# Patient Record
Sex: MALE | Race: WHITE | Employment: OTHER | URBAN - METROPOLITAN AREA
[De-identification: names, ages, dates, MRNs, and addresses within clinical notes are randomized per-mention and may not be internally consistent; named-entity substitution may affect disease eponyms.]

---

## 2017-02-13 ENCOUNTER — TELEPHONE (OUTPATIENT)
Dept: INTERNAL MEDICINE | Facility: CLINIC | Age: 62
End: 2017-02-13

## 2017-02-13 ENCOUNTER — TRANSFERRED RECORDS (OUTPATIENT)
Dept: HEALTH INFORMATION MANAGEMENT | Facility: CLINIC | Age: 62
End: 2017-02-13

## 2017-02-13 NOTE — TELEPHONE ENCOUNTER
If his bp is down and doing better then they can continue to watch it. Otherwise probably needs the ER.  He can do a urine for the blood to make sure no infection.  He has a history of stones so may have blood from that as well.

## 2017-02-13 NOTE — TELEPHONE ENCOUNTER
Patient is called and informed of this message.  Patient understands and agrees to this plan.  Closing this encounter.    Suki Larson RN

## 2017-02-13 NOTE — TELEPHONE ENCOUNTER
Patient was down to see the infectious disease doctor this morning, and his BP was 193/96.  Wife is reporting he never has this high of BP reading.  IN the past they have had autonomic dysreflexia that has occasionally caused a spike in his BP readings, but they have not had this issue in a while and he did not get the other symptoms normally accompanying the high readings with it.      Wife is reporting patient is at work today, but she has noticed the past week he has been a lot more lethargic than normal.  He is reporting to her that he is not sleeping well and this is why he is having issues.  She is reporting he fell asleep while in the doctor appointment this morning.  She states he has been having small amounts of blood in his urine recently, but he will not tell her any symptoms he is having.    She is informed normally RN would send him to ED, but she is telling RN that trips to the ED usually lead to problems because the ED does not know the complexity of his health.  She is wondering if they have to go to the ED, or if PCP could suggest a better plan.    She is also wondering about the [facility] referral as she has not received a call from the [facility] yet.  Routing to PCP for further advice.    Suki Larson RN

## 2017-02-14 ENCOUNTER — TRANSFERRED RECORDS (OUTPATIENT)
Dept: HEALTH INFORMATION MANAGEMENT | Facility: CLINIC | Age: 62
End: 2017-02-14

## 2017-02-14 LAB — EJECTION FRACTION: 65

## 2017-02-17 ENCOUNTER — TRANSFERRED RECORDS (OUTPATIENT)
Dept: HEALTH INFORMATION MANAGEMENT | Facility: CLINIC | Age: 62
End: 2017-02-17

## 2017-03-21 ENCOUNTER — OFFICE VISIT (OUTPATIENT)
Dept: INTERNAL MEDICINE | Facility: CLINIC | Age: 62
End: 2017-03-21
Payer: COMMERCIAL

## 2017-03-21 VITALS
BODY MASS INDEX: 27.89 KG/M2 | TEMPERATURE: 96.8 F | OXYGEN SATURATION: 97 % | WEIGHT: 200 LBS | DIASTOLIC BLOOD PRESSURE: 66 MMHG | SYSTOLIC BLOOD PRESSURE: 114 MMHG | HEART RATE: 72 BPM

## 2017-03-21 DIAGNOSIS — N20.0 CALCULUS OF KIDNEY: ICD-10-CM

## 2017-03-21 DIAGNOSIS — Z01.818 PREOP GENERAL PHYSICAL EXAM: Primary | ICD-10-CM

## 2017-03-21 DIAGNOSIS — E11.29 TYPE 2 DIABETES MELLITUS WITH OTHER DIABETIC KIDNEY COMPLICATION (H): ICD-10-CM

## 2017-03-21 DIAGNOSIS — G82.50 QUADRIPLEGIA (H): ICD-10-CM

## 2017-03-21 LAB
ALBUMIN SERPL-MCNC: 3.1 G/DL (ref 3.4–5)
ALP SERPL-CCNC: 106 U/L (ref 40–150)
ALT SERPL W P-5'-P-CCNC: 20 U/L (ref 0–70)
ANION GAP SERPL CALCULATED.3IONS-SCNC: 7 MMOL/L (ref 3–14)
AST SERPL W P-5'-P-CCNC: 11 U/L (ref 0–45)
BILIRUB SERPL-MCNC: 0.5 MG/DL (ref 0.2–1.3)
BUN SERPL-MCNC: 34 MG/DL (ref 7–30)
CALCIUM SERPL-MCNC: 8.5 MG/DL (ref 8.5–10.1)
CHLORIDE SERPL-SCNC: 105 MMOL/L (ref 94–109)
CO2 SERPL-SCNC: 30 MMOL/L (ref 20–32)
CREAT SERPL-MCNC: 0.62 MG/DL (ref 0.66–1.25)
ERYTHROCYTE [DISTWIDTH] IN BLOOD BY AUTOMATED COUNT: 18.3 % (ref 10–15)
GFR SERPL CREATININE-BSD FRML MDRD: ABNORMAL ML/MIN/1.7M2
GLUCOSE SERPL-MCNC: 222 MG/DL (ref 70–99)
HBA1C MFR BLD: 6.6 % (ref 4.3–6)
HCT VFR BLD AUTO: 39.3 % (ref 40–53)
HGB BLD-MCNC: 12.5 G/DL (ref 13.3–17.7)
MCH RBC QN AUTO: 29 PG (ref 26.5–33)
MCHC RBC AUTO-ENTMCNC: 31.8 G/DL (ref 31.5–36.5)
MCV RBC AUTO: 91 FL (ref 78–100)
PLATELET # BLD AUTO: 217 10E9/L (ref 150–450)
POTASSIUM SERPL-SCNC: 4.3 MMOL/L (ref 3.4–5.3)
PROT SERPL-MCNC: 8 G/DL (ref 6.8–8.8)
RBC # BLD AUTO: 4.31 10E12/L (ref 4.4–5.9)
SODIUM SERPL-SCNC: 142 MMOL/L (ref 133–144)
WBC # BLD AUTO: 6.3 10E9/L (ref 4–11)

## 2017-03-21 PROCEDURE — 36415 COLL VENOUS BLD VENIPUNCTURE: CPT | Performed by: INTERNAL MEDICINE

## 2017-03-21 PROCEDURE — 80053 COMPREHEN METABOLIC PANEL: CPT | Performed by: INTERNAL MEDICINE

## 2017-03-21 PROCEDURE — 99214 OFFICE O/P EST MOD 30 MIN: CPT | Performed by: INTERNAL MEDICINE

## 2017-03-21 PROCEDURE — 83036 HEMOGLOBIN GLYCOSYLATED A1C: CPT | Performed by: INTERNAL MEDICINE

## 2017-03-21 PROCEDURE — 93000 ELECTROCARDIOGRAM COMPLETE: CPT | Performed by: INTERNAL MEDICINE

## 2017-03-21 PROCEDURE — 85027 COMPLETE CBC AUTOMATED: CPT | Performed by: INTERNAL MEDICINE

## 2017-03-21 ASSESSMENT — PAIN SCALES - GENERAL: PAINLEVEL: SEVERE PAIN (6)
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
## 2017-03-21 NOTE — NURSING NOTE
"Chief Complaint   Patient presents with     Pre-Op Exam       Initial /66 (BP Location: Left arm, Patient Position: Chair, Cuff Size: Adult Large)  Pulse 72  Temp 96.8  F (36  C) (Temporal)  Wt 200 lb (90.7 kg)  SpO2 97%  BMI 27.89 kg/m2 Estimated body mass index is 27.89 kg/(m^2) as calculated from the following:    Height as of 9/9/16: 5' 11\" (1.803 m).    Weight as of this encounter: 200 lb (90.7 kg).  Medication Reconciliation: complete    "

## 2017-03-21 NOTE — MR AVS SNAPSHOT
After Visit Summary   3/21/2017    Alexandre Velásquez    MRN: [id number]           Patient Information     Date Of Birth          1955        Visit Information        Provider Department      3/21/2017 9:00 AM Manny Seymour MD [facility]        Today's Diagnoses     Preop general physical exam    -  1      Care Instructions      Before Your Surgery      Call your surgeon if there is any change in your health. This includes signs of a cold or flu (such as a sore throat, runny nose, cough, rash or fever).    Do not smoke, drink alcohol or take over the counter medicine (unless your surgeon or primary care doctor tells you to) for the 24 hours before and after surgery.    If you take prescribed drugs: Follow your doctor s orders about which medicines to take and which to stop until after surgery.    Eating and drinking prior to surgery: follow the instructions from your surgeon    Take a shower or bath the night before surgery. Use the soap your surgeon gave you to gently clean your skin. If you do not have soap from your surgeon, use your regular soap. Do not shave or scrub the surgery site.  Wear clean pajamas and have clean sheets on your bed.         Follow-ups after your visit        Who to contact     If you have questions or need follow up information about today's clinic visit or your schedule please contact [facility] directly at [phone].  Normal or non-critical lab and imaging results will be communicated to you by MyChart, letter or phone within 4 business days after the clinic has received the results. If you do not hear from us within 7 days, please contact the clinic through MyChart or phone. If you have a critical or abnormal lab result, we will notify you by phone as soon as possible.  Submit refill requests through [employer] or call your pharmacy and they will forward the refill request to us. Please allow 3 business days for your refill to be  "completed.          Additional Information About Your Visit        [employer] Information     [employer] lets you send messages to your doctor, view your test results, renew your prescriptions, schedule appointments and more. To sign up, go to [email or web address] . Click on \"Log in\" on the left side of the screen, which will take you to the Welcome page. Then click on \"Sign up Now\" on the right side of the page.     You will be asked to enter the access code listed below, as well as some personal information. Please follow the directions to create your username and password.     Your access code is: [email or web address]  Expires: 2017  9:09 AM     Your access code will  in 90 days. If you need help or a new code, please call your [facility] clinic or [phone].        Care EveryWhere ID     This is your Care EveryWhere ID. This could be used by other organizations to access your [facility] medical records  [id number]        Your Vitals Were     Pulse Temperature Pulse Oximetry BMI (Body Mass Index)          72 96.8  F (36  C) (Temporal) 97% 27.89 kg/m2         Blood Pressure from Last 3 Encounters:   17 114/66   16 98/62   16 120/64    Weight from Last 3 Encounters:   17 200 lb (90.7 kg)   16 195 lb (88.5 kg)   16 200 lb 9.9 oz (91 kg)              Today, you had the following     No orders found for display       Primary Care Provider Office Phone # Fax #    Manny Seymour [phone]       [facility] [address] [place] [address]        Thank you!     Thank you for choosing [facility]  for your care. Our goal is always to provide you with excellent care. Hearing back from our patients is one way we can continue to improve our services. Please take a few minutes to complete the written survey that you may receive in the mail after your visit with us. Thank you!             Your Updated Medication List - Protect others " around you: Learn how to safely use, store and throw away your medicines at www.disposemymeds.org.          This list is accurate as of: 3/21/17  9:09 AM.  Always use your most recent med list.                   Brand Name Dispense Instructions for use    albuterol 108 (90 BASE) MCG/ACT Inhaler    PROAIR HFA/PROVENTIL HFA/VENTOLIN HFA    1 Inhaler    Inhale 2 puffs into the lungs every 6 hours as needed for shortness of breath / dyspnea or wheezing       baclofen 20 MG tablet    LIORESAL    270 tablet    TAKE 1 TABLET (20 MG) BY MOUTH 4 TIMES DAILY AS NEEDED       CHOICE DM FIBER-BURST OR      Take  by mouth daily. Am and pm       COENZYME Q-10 PO      Take 100 mg by mouth 2 times daily.       DULoxetine 60 MG EC capsule    CYMBALTA    90 capsule    TAKE 1 CAPSULE (60 MG) BY MOUTH DAILY       fish oil-omega-3 fatty acids 1000 MG capsule      Take 1,000 mg by mouth 2 times daily       FLORASTOR 250 MG capsule   Generic drug:  saccharomyces boulardii      Take 250 mg by mouth daily       furosemide 20 MG tablet    LASIX    30 tablet    Take 1 tablet (20 mg) by mouth daily as needed       glipiZIDE XL 5 MG 24 hr tablet   Generic drug:  glipiZIDE     90 tablet    TAKE 1 TABLET (5 MG) BY MOUTH DAILY       MIRALAX PO      Take by mouth 2 times daily       Multiple vitamin Tabs      1 TABLET DAILY       * order for DME     1 Device    Wheelchair repair [facility] fax#  [phone]       * order for DME     1 Device    Battery for power wheel chair       * order for DME     1 each    Equipment being ordered: Wheel chair repair.  Wheelchair necessary for patient.       * order for DME     30 days    Equipment being ordered:[facility] Fax [phone] Reference Number# [id number] Primary Dressing Drawtex 3x39 roll Qty 5 rolls Length of Need: 1 month Frequency of dressing change: daily       order for DME     1 REMOVE    Equipment being ordered: Wheelchair Repair       * order for DME     1 Device    Equipment being1 ordered:  Wheelchair-Kilt motor for electric wheelchair       pantoprazole 40 MG EC tablet    PROTONIX    60 tablet    Take 1 tablet (40 mg) by mouth 2 times daily (before meals)       probiotic Caps      daily       sennosides 8.6 MG tablet    SENOKOT    120 each    Take 2 tablets by mouth 2 times daily       PILAR FOR MEN Misc     120 Bottle    1 pad as needed.       TYLENOL CAPS 500 MG OR     60 Cap    2 CAPSULE EVERY 6 HOURS AS NEEDED (taking 4 x daily as needed)       * Notice:  This list has 5 medication(s) that are the same as other medications prescribed for you. Read the directions carefully, and ask your doctor or other care provider to review them with you.

## 2017-03-27 PROBLEM — I63.40 CEREBRAL INFARCTION DUE TO EMBOLISM OF CEREBRAL ARTERY (H): Status: ACTIVE | Noted: 2017-03-27

## 2017-04-05 ENCOUNTER — TRANSFERRED RECORDS (OUTPATIENT)
Dept: HEALTH INFORMATION MANAGEMENT | Facility: CLINIC | Age: 62
End: 2017-04-05

## 2017-04-19 DIAGNOSIS — G82.50 QUADRIPLEGIA (H): ICD-10-CM

## 2017-04-19 DIAGNOSIS — G89.4 CHRONIC PAIN SYNDROME: ICD-10-CM

## 2017-04-20 NOTE — TELEPHONE ENCOUNTER
baclofen (LIORESAL) 20 MG tablet      Last Written Prescription Date:  6/17/16  Last Fill Quantity: 270,   # refills: 3  Last Office Visit with [facility], [facility] or [facility] prescribing provider: 12/14/16  Future Office visit:       Routing refill request to provider for review/approval because:  Drug not on the [facility], [facility] or [facility] refill protocol or controlled substance

## 2017-04-21 RX ORDER — BACLOFEN 20 MG/1
TABLET ORAL
Qty: 270 TABLET | Refills: 1 | Status: SHIPPED | OUTPATIENT
Start: 2017-04-21 | End: 2018-02-13

## 2017-05-05 DIAGNOSIS — L89.324 DECUBITUS ULCER OF LEFT ISCHIUM, STAGE 4 (H): ICD-10-CM

## 2017-05-05 NOTE — TELEPHONE ENCOUNTER
Cymbalta     Last Written Prescription Date: 10/18/2016  Last Fill Quantity: 90, # refills: 1  Last Office Visit with [facility], [facility] or [facility] prescribing provider: 3/21/2017        BP Readings from Last 3 Encounters:   03/21/17 114/66   11/04/16 98/62   09/09/16 120/64     Pulse: (for Fetzima)  Creatinine   Date Value Ref Range Status   03/21/2017 0.62 (L) 0.66 - 1.25 mg/dL Final   ]    Last PHQ-9 score on record= No flowsheet data found.

## 2017-05-08 RX ORDER — DULOXETIN HYDROCHLORIDE 60 MG/1
CAPSULE, DELAYED RELEASE ORAL
Qty: 90 CAPSULE | Refills: 1 | Status: SHIPPED | OUTPATIENT
Start: 2017-05-08 | End: 2018-02-22

## 2017-05-08 NOTE — TELEPHONE ENCOUNTER
Routing refill request to provider for review/approval because:  Labs out of range:  Cr 0.62  Kasia Mckenna RN

## 2017-05-24 ENCOUNTER — TRANSFERRED RECORDS (OUTPATIENT)
Dept: HEALTH INFORMATION MANAGEMENT | Facility: CLINIC | Age: 62
End: 2017-05-24

## 2017-05-24 ENCOUNTER — TELEPHONE (OUTPATIENT)
Dept: INTERNAL MEDICINE | Facility: CLINIC | Age: 62
End: 2017-05-24

## 2017-05-24 NOTE — TELEPHONE ENCOUNTER
Spoke to the patient's wife. She said today the patient is just not feeling well. She said he is breathing very shallow and not catching his breath very well. Wife said they did do an Albuterol neb treatment but that did not help him at all. Wife said the patient has been taking off his cpap at night and she feels this is some of the cause. Wife said she is concerned because he feels very spacy, lightheaded, and dizzy.     RN advised if he is breathing shallow, having a hard time catching his breath, lightheaded and not feeling well, he needs to be seen. Wife agrees. She would like to bring him to the ED. Wife said they will likely go to the [facility] ED. She said the [facility] ED usually sends him out any ways so they would like to skip that process.     Wife said she is wondering if the patient needs oxygen added to his cpap. She said she is going to call the cpap company to see if the patient can get a better mask. And then she would like to discuss adding oxygen.     Wife requested a message be sent to Dr. Seymour so he knows what is going on. She said they will likely need an oxygen order for his cpap if that is recommended. Wife is bringing patient to the ED now.     Will route to PCP as PAOLA.     Lisa Aquino RN  [facility]

## 2017-05-24 NOTE — TELEPHONE ENCOUNTER
"Reason for call:  Patient reporting a symptom    Symptom or request: Patient has been short of breath and feeling light headed and \"spacey\" This is due to lack of oxygen because he takes his cpap mask off at night. Wife if wondering if he should go to the ED or should he be seen by Dr. Seymour. Wondering if maybe O2 should be added to his cpap machine.    Duration (how long have symptoms been present): 1 week    Have you been treated for this before? Yes    Phone Number patient can be reached at:  Home number on file [phone]    Best Time:  any    Can we leave a detailed message on this number:  YES    Call taken on 5/24/2017 at 8:20 AM by Luba Prabhakar    "

## 2017-05-25 ENCOUNTER — TRANSFERRED RECORDS (OUTPATIENT)
Dept: HEALTH INFORMATION MANAGEMENT | Facility: CLINIC | Age: 62
End: 2017-05-25

## 2017-05-27 ENCOUNTER — TRANSFERRED RECORDS (OUTPATIENT)
Dept: HEALTH INFORMATION MANAGEMENT | Facility: CLINIC | Age: 62
End: 2017-05-27

## 2017-05-27 LAB — EJECTION FRACTION: 60

## 2017-05-31 ENCOUNTER — TRANSFERRED RECORDS (OUTPATIENT)
Dept: HEALTH INFORMATION MANAGEMENT | Facility: CLINIC | Age: 62
End: 2017-05-31

## 2017-05-31 LAB — EJECTION FRACTION: 60

## 2017-06-02 ENCOUNTER — TRANSFERRED RECORDS (OUTPATIENT)
Dept: HEALTH INFORMATION MANAGEMENT | Facility: CLINIC | Age: 62
End: 2017-06-02

## 2017-06-28 ENCOUNTER — TELEPHONE (OUTPATIENT)
Dept: INTERNAL MEDICINE | Facility: CLINIC | Age: 62
End: 2017-06-28

## 2017-06-28 ENCOUNTER — OFFICE VISIT (OUTPATIENT)
Dept: INTERNAL MEDICINE | Facility: CLINIC | Age: 62
End: 2017-06-28
Payer: COMMERCIAL

## 2017-06-28 ENCOUNTER — TRANSFERRED RECORDS (OUTPATIENT)
Dept: HEALTH INFORMATION MANAGEMENT | Facility: CLINIC | Age: 62
End: 2017-06-28

## 2017-06-28 VITALS
RESPIRATION RATE: 12 BRPM | TEMPERATURE: 96.9 F | OXYGEN SATURATION: 98 % | BODY MASS INDEX: 29.15 KG/M2 | HEART RATE: 66 BPM | WEIGHT: 209 LBS | DIASTOLIC BLOOD PRESSURE: 56 MMHG | SYSTOLIC BLOOD PRESSURE: 84 MMHG

## 2017-06-28 DIAGNOSIS — Z01.818 PREOP GENERAL PHYSICAL EXAM: Primary | ICD-10-CM

## 2017-06-28 DIAGNOSIS — N20.0 KIDNEY STONE: ICD-10-CM

## 2017-06-28 DIAGNOSIS — E11.29 TYPE 2 DIABETES MELLITUS WITH OTHER DIABETIC KIDNEY COMPLICATION (H): ICD-10-CM

## 2017-06-28 DIAGNOSIS — G82.50 QUADRIPLEGIA (H): ICD-10-CM

## 2017-06-28 LAB
ALBUMIN SERPL-MCNC: 2.8 G/DL (ref 3.4–5)
ALP SERPL-CCNC: 82 U/L (ref 40–150)
ALT SERPL W P-5'-P-CCNC: 20 U/L (ref 0–70)
ANION GAP SERPL CALCULATED.3IONS-SCNC: 6 MMOL/L (ref 3–14)
AST SERPL W P-5'-P-CCNC: 13 U/L (ref 0–45)
BILIRUB SERPL-MCNC: 0.3 MG/DL (ref 0.2–1.3)
BUN SERPL-MCNC: 33 MG/DL (ref 7–30)
CALCIUM SERPL-MCNC: 9.1 MG/DL (ref 8.5–10.1)
CHLORIDE SERPL-SCNC: 104 MMOL/L (ref 94–109)
CO2 SERPL-SCNC: 32 MMOL/L (ref 20–32)
CREAT SERPL-MCNC: 0.66 MG/DL (ref 0.66–1.25)
ERYTHROCYTE [DISTWIDTH] IN BLOOD BY AUTOMATED COUNT: 20.9 % (ref 10–15)
GFR SERPL CREATININE-BSD FRML MDRD: ABNORMAL ML/MIN/1.7M2
GLUCOSE SERPL-MCNC: 150 MG/DL (ref 70–99)
HCT VFR BLD AUTO: 27.8 % (ref 40–53)
HGB BLD-MCNC: 8 G/DL (ref 13.3–17.7)
MCH RBC QN AUTO: 27.7 PG (ref 26.5–33)
MCHC RBC AUTO-ENTMCNC: 28.8 G/DL (ref 31.5–36.5)
MCV RBC AUTO: 96 FL (ref 78–100)
PLATELET # BLD AUTO: 280 10E9/L (ref 150–450)
POTASSIUM SERPL-SCNC: 4.1 MMOL/L (ref 3.4–5.3)
PROT SERPL-MCNC: 8.9 G/DL (ref 6.8–8.8)
RBC # BLD AUTO: 2.89 10E12/L (ref 4.4–5.9)
SODIUM SERPL-SCNC: 142 MMOL/L (ref 133–144)
WBC # BLD AUTO: 8.1 10E9/L (ref 4–11)

## 2017-06-28 PROCEDURE — 36415 COLL VENOUS BLD VENIPUNCTURE: CPT | Performed by: INTERNAL MEDICINE

## 2017-06-28 PROCEDURE — 99214 OFFICE O/P EST MOD 30 MIN: CPT | Performed by: INTERNAL MEDICINE

## 2017-06-28 PROCEDURE — 85027 COMPLETE CBC AUTOMATED: CPT | Performed by: INTERNAL MEDICINE

## 2017-06-28 PROCEDURE — 80053 COMPREHEN METABOLIC PANEL: CPT | Performed by: INTERNAL MEDICINE

## 2017-06-28 RX ORDER — DABIGATRAN ETEXILATE 150 MG/1
150 CAPSULE ORAL EVERY EVENING
COMMUNITY
End: 2017-10-03

## 2017-06-28 NOTE — TELEPHONE ENCOUNTER
Called and left message for patient to call clinic.  When they call back please give them the providers message.  Casey Archer MA
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
## 2017-06-28 NOTE — NURSING NOTE
"Chief Complaint   Patient presents with     Pre-Op Exam       Initial BP (!) 84/56  Pulse 66  Temp 96.9  F (36.1  C) (Temporal)  Resp 12  Wt 209 lb (94.8 kg)  SpO2 98%  BMI 29.15 kg/m2 Estimated body mass index is 29.15 kg/(m^2) as calculated from the following:    Height as of 9/9/16: 5' 11\" (1.803 m).    Weight as of this encounter: 209 lb (94.8 kg).  Medication Reconciliation: complete    "

## 2017-06-28 NOTE — TELEPHONE ENCOUNTER
----- Message from Manny Seymour MD sent at 6/28/2017 10:43 AM CDT -----  His labs show he has anemia with a hgb down to 8.0, they may want to check on the day of surgery as well.  Albumin is down to 2.8 so continue to work on nutrition.    Please call wily to his wife's cell phone

## 2017-06-28 NOTE — MR AVS SNAPSHOT
After Visit Summary   6/28/2017    Alexandre Velásquez    MRN: [id number]           Patient Information     Date Of Birth          1955        Visit Information        Provider Department      6/28/2017 9:00 AM Manny Seymour MD [facility]        Today's Diagnoses     Preop general physical exam    -  1      Care Instructions      Before Your Surgery      Call your surgeon if there is any change in your health. This includes signs of a cold or flu (such as a sore throat, runny nose, cough, rash or fever).    Do not smoke, drink alcohol or take over the counter medicine (unless your surgeon or primary care doctor tells you to) for the 24 hours before and after surgery.    If you take prescribed drugs: Follow your doctor s orders about which medicines to take and which to stop until after surgery.    Eating and drinking prior to surgery: follow the instructions from your surgeon    Take a shower or bath the night before surgery. Use the soap your surgeon gave you to gently clean your skin. If you do not have soap from your surgeon, use your regular soap. Do not shave or scrub the surgery site.  Wear clean pajamas and have clean sheets on your bed.           Follow-ups after your visit        Who to contact     If you have questions or need follow up information about today's clinic visit or your schedule please contact [facility] directly at [phone].  Normal or non-critical lab and imaging results will be communicated to you by MyChart, letter or phone within 4 business days after the clinic has received the results. If you do not hear from us within 7 days, please contact the clinic through MyChart or phone. If you have a critical or abnormal lab result, we will notify you by phone as soon as possible.  Submit refill requests through [employer] or call your pharmacy and they will forward the refill request to us. Please allow 3 business days for your refill to be  "completed.          Additional Information About Your Visit        [employer] Information     [employer] lets you send messages to your doctor, view your test results, renew your prescriptions, schedule appointments and more. To sign up, go to [email or web address] . Click on \"Log in\" on the left side of the screen, which will take you to the Welcome page. Then click on \"Sign up Now\" on the right side of the page.     You will be asked to enter the access code listed below, as well as some personal information. Please follow the directions to create your username and password.     Your access code is: [email or web address]  Expires: 2017  9:02 AM     Your access code will  in 90 days. If you need help or a new code, please call your [facility] clinic or [phone].        Care EveryWhere ID     This is your Care EveryWhere ID. This could be used by other organizations to access your [facility] medical records  [id number]        Your Vitals Were     Pulse Temperature Respirations Pulse Oximetry BMI (Body Mass Index)       66 96.9  F (36.1  C) (Temporal) 12 98% 29.15 kg/m2        Blood Pressure from Last 3 Encounters:   17 (!) 84/56   17 114/66   16 98/62    Weight from Last 3 Encounters:   17 209 lb (94.8 kg)   17 200 lb (90.7 kg)   16 195 lb (88.5 kg)              Today, you had the following     No orders found for display       Primary Care Provider Office Phone # Fax #    Manny Seymour [phone]       [facility] [address] [place] [address]        Equal Access to Services     [facility] Center[facility] AH: Hadii sergio Stat[place], waaxda luqadaha, qaybta kaalmada lidiada, kwame eaton. So [facility] [phone].    ATENCIÓN: Si habla español, tiene a mckeon disposición servicios gratuitos de asistencia lingüística. Llame al [phone].    We comply with applicable federal civil rights laws and [place] laws. We do not " discriminate on the basis of race, color, national origin, age, disability sex, sexual orientation or gender identity.            Thank you!     Thank you for choosing [facility]  for your care. Our goal is always to provide you with excellent care. Hearing back from our patients is one way we can continue to improve our services. Please take a few minutes to complete the written survey that you may receive in the mail after your visit with us. Thank you!             Your Updated Medication List - Protect others around you: Learn how to safely use, store and throw away your medicines at www.disposemymeds.org.          This list is accurate as of: 6/28/17  9:02 AM.  Always use your most recent med list.                   Brand Name Dispense Instructions for use Diagnosis    albuterol 108 (90 BASE) MCG/ACT Inhaler    PROAIR HFA/PROVENTIL HFA/VENTOLIN HFA    1 Inhaler    Inhale 2 puffs into the lungs every 6 hours as needed for shortness of breath / dyspnea or wheezing    SOB (shortness of breath)       baclofen 20 MG tablet    LIORESAL    270 tablet    TAKE 1 TABLET (20 MG) BY MOUTH 4 TIMES DAILY AS NEEDED    Chronic pain syndrome, Quadriplegia (H)       CHOICE DM FIBER-BURST OR      Take  by mouth daily. Am and pm    Diabetes mellitus, type 2 (H), Quadriplegia (H), Osteomyelitis (H), Spasms       COENZYME Q-10 PO      Take 100 mg by mouth 2 times daily.        DULoxetine 60 MG EC capsule    CYMBALTA    90 capsule    TAKE 1 CAPSULE (60 MG) BY MOUTH DAILY    Decubitus ulcer of left ischium, stage 4 (H)       FLORASTOR 250 MG capsule   Generic drug:  saccharomyces boulardii      Take 250 mg by mouth daily        furosemide 20 MG tablet    LASIX    30 tablet    Take 1 tablet (20 mg) by mouth daily as needed    Diastolic dysfunction, SOB (shortness of breath)       glipiZIDE XL 5 MG 24 hr tablet   Generic drug:  glipiZIDE     90 tablet    TAKE 1 TABLET (5 MG) BY MOUTH DAILY    Type 2 diabetes mellitus with  other diabetic kidney complication (H)       MIRALAX PO      Take by mouth 2 times daily        Multiple vitamin Tabs      1 TABLET DAILY        order for DME     1 Device    Wheelchair repair [facility] [employer] fax#  [phone]    Quadriplegia (H)       * order for DME     30 days    Equipment being ordered:[employer] Fax [phone] Reference Number# [id number] Primary Dressing Drawtex 3x39 roll Qty 5 rolls Length of Need: 1 month Frequency of dressing change: daily    Pressure ulcer of trochanteric region of left hip, stage 4 (H)       order for DME     1 REMOVE    Equipment being ordered: Wheelchair Repair    Cerebral infarction due to embolism of cerebral artery (H)       * order for DME     1 Device    Battery for power wheel chair    Quadriplegia (H)       PRADAXA ANTICOAGULANT 150 MG capsule   Generic drug:  dabigatran ANTICOAGULANT      Take 150 mg by mouth every evening Store in original 's bottle or blister pack; use within 120 days of opening.        probiotic Caps      daily        sennosides 8.6 MG tablet    SENOKOT    120 each    Take 2 tablets by mouth 2 times daily    Orthopnea       PILAR FOR MEN Misc     120 Bottle    1 pad as needed.    Quadriplegia (H)       TYLENOL CAPS 500 MG OR     60 Cap    2 CAPSULE EVERY 6 HOURS AS NEEDED (taking 4 x daily as needed)        * Notice:  This list has 2 medication(s) that are the same as other medications prescribed for you. Read the directions carefully, and ask your doctor or other care provider to review them with you.

## 2017-06-29 NOTE — TELEPHONE ENCOUNTER
2nd attempt.  Called and left message for patient to call clinic.  When they call back please give them the providers message.  Casey Archer MA

## 2017-07-07 ENCOUNTER — TRANSFERRED RECORDS (OUTPATIENT)
Dept: HEALTH INFORMATION MANAGEMENT | Facility: CLINIC | Age: 62
End: 2017-07-07

## 2017-07-20 ENCOUNTER — TRANSFERRED RECORDS (OUTPATIENT)
Dept: HEALTH INFORMATION MANAGEMENT | Facility: CLINIC | Age: 62
End: 2017-07-20

## 2017-08-01 ENCOUNTER — OFFICE VISIT (OUTPATIENT)
Dept: INTERNAL MEDICINE | Facility: CLINIC | Age: 62
End: 2017-08-01
Payer: COMMERCIAL

## 2017-08-01 VITALS
TEMPERATURE: 96.5 F | SYSTOLIC BLOOD PRESSURE: 112 MMHG | DIASTOLIC BLOOD PRESSURE: 78 MMHG | OXYGEN SATURATION: 97 % | RESPIRATION RATE: 12 BRPM | HEART RATE: 70 BPM

## 2017-08-01 DIAGNOSIS — Z88.1 ALLERGY TO ANTIBIOTIC: ICD-10-CM

## 2017-08-01 DIAGNOSIS — G47.33 OSA (OBSTRUCTIVE SLEEP APNEA): ICD-10-CM

## 2017-08-01 DIAGNOSIS — E87.29 CO2 RETENTION: ICD-10-CM

## 2017-08-01 DIAGNOSIS — G82.50 QUADRIPLEGIA (H): ICD-10-CM

## 2017-08-01 DIAGNOSIS — N21.0 BLADDER STONES: Primary | ICD-10-CM

## 2017-08-01 PROCEDURE — 99214 OFFICE O/P EST MOD 30 MIN: CPT | Performed by: INTERNAL MEDICINE

## 2017-08-01 ASSESSMENT — PAIN SCALES - GENERAL: PAINLEVEL: NO PAIN (0)
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
## 2017-08-01 NOTE — NURSING NOTE
"Chief Complaint   Patient presents with     RECHECK     follow up from last surgery     Referral     orthotics and sleep study       Initial /78  Pulse 70  Temp 96.5  F (35.8  C) (Temporal)  Resp 12  SpO2 97% Estimated body mass index is 29.15 kg/(m^2) as calculated from the following:    Height as of 9/9/16: 5' 11\" (1.803 m).    Weight as of 6/28/17: 209 lb (94.8 kg).  Medication Reconciliation: complete    "

## 2017-08-01 NOTE — MR AVS SNAPSHOT
"              After Visit Summary   2017    Alexandre Velásquez    MRN: [id number]           Patient Information     Date Of Birth          1955        Visit Information        Provider Department      2017 9:15 AM Manny Seymour MD [facility]         Follow-ups after your visit        Who to contact     If you have questions or need follow up information about today's clinic visit or your schedule please contact [facility] directly at [phone].  Normal or non-critical lab and imaging results will be communicated to you by MyChart, letter or phone within 4 business days after the clinic has received the results. If you do not hear from us within 7 days, please contact the clinic through [employer] or phone. If you have a critical or abnormal lab result, we will notify you by phone as soon as possible.  Submit refill requests through [employer] or call your pharmacy and they will forward the refill request to us. Please allow 3 business days for your refill to be completed.          Additional Information About Your Visit        [facility] Information     [employer] lets you send messages to your doctor, view your test results, renew your prescriptions, schedule appointments and more. To sign up, go to [email or web address] . Click on \"Log in\" on the left side of the screen, which will take you to the Welcome page. Then click on \"Sign up Now\" on the right side of the page.     You will be asked to enter the access code listed below, as well as some personal information. Please follow the directions to create your username and password.     Your access code is: [email or web address]  Expires: 2017  9:02 AM     Your access code will  in 90 days. If you need help or a new code, please call your [facility] or [phone].        Care EveryWhere ID     This is your Care EveryWhere ID. This could be used by other organizations to access your [facility] medical " records  [id number]        Your Vitals Were     Pulse Temperature Respirations Pulse Oximetry          70 96.5  F (35.8  C) (Temporal) 12 97%         Blood Pressure from Last 3 Encounters:   08/01/17 112/78   06/28/17 (!) 84/56   03/21/17 114/66    Weight from Last 3 Encounters:   06/28/17 209 lb (94.8 kg)   03/21/17 200 lb (90.7 kg)   09/09/16 195 lb (88.5 kg)              Today, you had the following     No orders found for display       Primary Care Provider Office Phone # Fax #    Manny Seymour [phone]       [facility] [address] [place] [address]        Equal Access to Services     SHIRA MILLS : Hadii sergio ku hadasho Soomaali, waaxda luqadaha, qaybta kaalmada adeegyada, waxay idiin hayrossyn jerri corado . So [facility] [place] [phone].    ATENCIÓN: Si habla español, tiene a mckeon disposición servicios gratuitos de asistencia lingüística. Llame al [phone].    We comply with applicable federal civil rights laws and [place] laws. We do not discriminate on the basis of race, color, national origin, age, disability sex, sexual orientation or gender identity.            Thank you!     Thank you for choosing [facility]  for your care. Our goal is always to provide you with excellent care. Hearing back from our patients is one way we can continue to improve our services. Please take a few minutes to complete the written survey that you may receive in the mail after your visit with us. Thank you!             Your Updated Medication List - Protect others around you: Learn how to safely use, store and throw away your medicines at www.disposemymeds.org.          This list is accurate as of: 8/1/17  9:20 AM.  Always use your most recent med list.                   Brand Name Dispense Instructions for use Diagnosis    albuterol 108 (90 BASE) MCG/ACT Inhaler    PROAIR HFA/PROVENTIL HFA/VENTOLIN HFA    1 Inhaler    Inhale 2 puffs into the lungs every 6 hours as  needed for shortness of breath / dyspnea or wheezing    SOB (shortness of breath)       baclofen 20 MG tablet    LIORESAL    270 tablet    TAKE 1 TABLET (20 MG) BY MOUTH 4 TIMES DAILY AS NEEDED    Chronic pain syndrome, Quadriplegia (H)       CHOICE DM FIBER-BURST OR      Take  by mouth daily. Am and pm    Diabetes mellitus, type 2 (H), Quadriplegia (H), Osteomyelitis (H), Spasms       COENZYME Q-10 PO      Take 100 mg by mouth 2 times daily.        DULoxetine 60 MG EC capsule    CYMBALTA    90 capsule    TAKE 1 CAPSULE (60 MG) BY MOUTH DAILY    Decubitus ulcer of left ischium, stage 4 (H)       FLORASTOR 250 MG capsule   Generic drug:  saccharomyces boulardii      Take 250 mg by mouth daily        furosemide 20 MG tablet    LASIX    30 tablet    Take 1 tablet (20 mg) by mouth daily as needed    Diastolic dysfunction, SOB (shortness of breath)       glipiZIDE XL 5 MG 24 hr tablet   Generic drug:  glipiZIDE     90 tablet    TAKE 1 TABLET (5 MG) BY MOUTH DAILY    Type 2 diabetes mellitus with other diabetic kidney complication (H)       MIRALAX PO      Take by mouth 2 times daily        Multiple vitamin Tabs      1 TABLET DAILY        order for DME     1 Device    Wheelchair repair [facility] fax#  [phone]    Quadriplegia (H)       * order for DME     30 days    Equipment being ordered:[place] Fax [phone] Reference Number# [id number] Primary Dressing Drawtex 3x39 roll Qty 5 rolls Length of Need: 1 month Frequency of dressing change: daily    Pressure ulcer of trochanteric region of left hip, stage 4 (H)       order for DME     1 REMOVE    Equipment being ordered: Wheelchair Repair    Cerebral infarction due to embolism of cerebral artery (H)       * order for DME     1 Device    Battery for power wheel chair    Quadriplegia (H)       PRADAXA ANTICOAGULANT 150 MG capsule   Generic drug:  dabigatran ANTICOAGULANT      Take 150 mg by mouth every evening Store in original 's bottle or blister pack; use  within 120 days of opening.        probiotic Caps      daily        sennosides 8.6 MG tablet    SENOKOT    120 each    Take 2 tablets by mouth 2 times daily    Orthopnea       PILAR FOR MEN Misc     120 Bottle    1 pad as needed.    Quadriplegia (H)       TYLENOL CAPS 500 MG OR     60 Cap    2 CAPSULE EVERY 6 HOURS AS NEEDED (taking 4 x daily as needed)        * Notice:  This list has 2 medication(s) that are the same as other medications prescribed for you. Read the directions carefully, and ask your doctor or other care provider to review them with you.

## 2017-08-02 ASSESSMENT — PATIENT HEALTH QUESTIONNAIRE - PHQ9: SUM OF ALL RESPONSES TO PHQ QUESTIONS 1-9: 7

## 2017-08-09 ENCOUNTER — OFFICE VISIT (OUTPATIENT)
Dept: ALLERGY | Facility: OTHER | Age: 62
End: 2017-08-09
Payer: COMMERCIAL

## 2017-08-09 VITALS — OXYGEN SATURATION: 96 % | SYSTOLIC BLOOD PRESSURE: 112 MMHG | DIASTOLIC BLOOD PRESSURE: 68 MMHG | HEART RATE: 81 BPM

## 2017-08-09 DIAGNOSIS — B99.9 RECURRENT INFECTIONS: Primary | ICD-10-CM

## 2017-08-09 DIAGNOSIS — Z88.0 PENICILLIN ALLERGY: Primary | ICD-10-CM

## 2017-08-09 DIAGNOSIS — Z88.0 PENICILLIN ALLERGY: ICD-10-CM

## 2017-08-09 DIAGNOSIS — Z88.9 DRUG ALLERGY, MULTIPLE: ICD-10-CM

## 2017-08-09 DIAGNOSIS — Z91.018 FOOD ALLERGY: ICD-10-CM

## 2017-08-09 PROCEDURE — 86355 B CELLS TOTAL COUNT: CPT | Performed by: ALLERGY & IMMUNOLOGY

## 2017-08-09 PROCEDURE — 86317 IMMUNOASSAY INFECTIOUS AGENT: CPT | Mod: 90 | Performed by: ALLERGY & IMMUNOLOGY

## 2017-08-09 PROCEDURE — 86003 ALLG SPEC IGE CRUDE XTRC EA: CPT | Mod: 90 | Performed by: ALLERGY & IMMUNOLOGY

## 2017-08-09 PROCEDURE — 36415 COLL VENOUS BLD VENIPUNCTURE: CPT | Performed by: ALLERGY & IMMUNOLOGY

## 2017-08-09 PROCEDURE — 86774 TETANUS ANTIBODY: CPT | Performed by: ALLERGY & IMMUNOLOGY

## 2017-08-09 PROCEDURE — 99000 SPECIMEN HANDLING OFFICE-LAB: CPT | Performed by: ALLERGY & IMMUNOLOGY

## 2017-08-09 PROCEDURE — 99204 OFFICE O/P NEW MOD 45 MIN: CPT | Performed by: ALLERGY & IMMUNOLOGY

## 2017-08-09 PROCEDURE — 86359 T CELLS TOTAL COUNT: CPT | Performed by: ALLERGY & IMMUNOLOGY

## 2017-08-09 PROCEDURE — 82784 ASSAY IGA/IGD/IGG/IGM EACH: CPT | Performed by: ALLERGY & IMMUNOLOGY

## 2017-08-09 PROCEDURE — 86360 T CELL ABSOLUTE COUNT/RATIO: CPT | Performed by: ALLERGY & IMMUNOLOGY

## 2017-08-09 PROCEDURE — 86357 NK CELLS TOTAL COUNT: CPT | Performed by: ALLERGY & IMMUNOLOGY

## 2017-08-09 NOTE — Clinical Note
I saw Alexandre today. He will return for penicillin testing. The other antibiotics I would avoid and if needed he could be desensitized to any of the antibiotics. Also complete immune workup. Please see note for details. Thanks.   Chris Villeda

## 2017-08-09 NOTE — ASSESSMENT & PLAN NOTE
In addition to penicillin the patient has had multiple reactions to drugs. This has included cephalexin, ertapenem, ciprofloxacin, Macrobid. All of these reactions were rash on the second day of taking without other systemic symptoms. Rash after Macrobid. Additionally after taking gentamicin and vancomycin he developed renal failure which would be considered an adverse event as opposed to a drug allergy.    - Discussed with patient that unfortunately aside from penicillin antibiotics there is no great allergy testing for drugs. Protocols have been developed for multiple antibiotics for skin testing, but positive and negative predictive value is not known. Therefore if positive test would likely deem him as allergic and if negative test would proceed forward with a challenge. Similar to what is done for penicillin antibiotics, but with penicillin antibiotics we have a good idea what a negative means and what a positive means. Therefore, testing would likely not be indicated for other antibiotics, but if he were to need in the future he could receive any of these antibiotics via drug desensitization. This would allow him to tolerate the antibiotic for a short period of time without missing a dose to treat infection. This could be done inpatient or outpatient.

## 2017-08-09 NOTE — NURSING NOTE
"Chief Complaint   Patient presents with     Consult     medication allergy       Initial /68  Pulse 81  SpO2 96% Estimated body mass index is 29.15 kg/(m^2) as calculated from the following:    Height as of 9/9/16: 5' 11\" (1.803 m).    Weight as of 6/28/17: 209 lb (94.8 kg).  Medication Reconciliation: complete   Soheila Adair MA      "

## 2017-08-09 NOTE — ASSESSMENT & PLAN NOTE
Reproducibly after consuming kiwi the patient has had itchy ears and sensation of mouth fuzziness without any other IgE mediated symptoms. No history of allergy testing. No symptoms with latex.    - Serum IgE for kiwi.  - Continue to avoid kiwi.  - If positive testing would provide anaphylaxis action plan and injectable epinephrine.

## 2017-08-09 NOTE — ASSESSMENT & PLAN NOTE
History of recurrent osteomyelitis. Pneumonia ×1. No sinus infections. Patient is quadriplegic and osteo-myelitis/skin infection likely secondary to this. However possible immunodeficiency and therefore will complete immune workup.

## 2017-08-09 NOTE — MR AVS SNAPSHOT
After Visit Summary   8/9/2017    Alexandre Velásquez    MRN: [id number]           Patient Information     Date Of Birth          1955        Visit Information        Provider Department      8/9/2017 11:20 AM Chris Villeda DO [facility]        Today's Diagnoses     Recurrent infections    -  1    Food allergy          Care Instructions    Allergy Staff Appt Hours Shot Hours Locations    Physician     Chris Villeda DO       Support Staff     GUSTABO Castorena MA  Monday:                      [place] 8-7     Tuesday:         [place] 8-5 Wednesday:        [place]: 7-5 Friday:        [place] 7-5 [place] Monday: 9-6 Friday: 7-2     [place]        Tuesday: 7-12 Thursday: 1-6 [place] Tuesday: 1-6 Wednesday: 11-6 Thursday: 7-12 [facility]  [address] [place] [address]  Appt Line: [phone]  Allergy RN (Monday):  [phone]    [facility]  [address] [place] [address]  Appt Line: [phone]  Allergy RN (Tues & Wed):  [phone]    [facility]  [address] [place] [address]  Appt Line: [phone]  Allergy RN (Friday):  [phone]       Important Scheduling Information  Aspirin Desensitization: Appt will last 2 clinic days. Please call the Allergy RN line for your clinic to schedule. Discontinue antihistamines 7 days prior to the appointment.     Food Challenges: Appt will last 3-4 hours. Please call the Allergy RN line for your clinic to schedule. Discontinue antihistamines 7 days prior to the appointment.     Penicillin Testing: Appt will last 2-3 hours. Please call the Allergy RN line for your clinic to schedule. Discontinue antihistamines 7 days prior to the appointment.     Skin Testing: Appt will about 40 minutes. Call the appointment line for your clinic to schedule. Discontinue antihistamines 7 days prior to the appointment.     Venom  "Testing: Appt will last 2-3 hours. Please call the Allergy RN line for your clinic to schedule. Discontinue antihistamines 7 days prior to the appointment.     Thank you for trusting us with your Allergy, Asthma, and Immunology care. Please feel free to contact us with any questions or concerns you may have.      - Blood testing today.   - Return to clinic for penicillin skin testing.           Follow-ups after your visit        Your next 10 appointments already scheduled     Aug 31, 2017 11:00 AM CDT   New Sleep Patient with KAYKAY Son   [facility])    [address] [address] [address] [phone]              Who to contact     If you have questions or need follow up information about today's clinic visit or your schedule please contact [facility] directly at [phone].  Normal or non-critical lab and imaging results will be communicated to you by MyChart, letter or phone within 4 business days after the clinic has received the results. If you do not hear from us within 7 days, please contact the clinic through [employer] or phone. If you have a critical or abnormal lab result, we will notify you by phone as soon as possible.  Submit refill requests through "[employer] or call your pharmacy and they will forward the refill request to us. Please allow 3 business days for your refill to be completed.          Additional Information About Your Visit        [employer] Information     "[employer] lets you send messages to your doctor, view your test results, renew your prescriptions, schedule appointments and more. To sign up, go to [email or web address] . Click on \"Log in\" on the left side of the screen, which will take you to the Welcome page. Then click on \"Sign up Now\" on the right side of the page.     You will be asked to enter the access code listed below, as well as some personal information. Please follow the " directions to create your username and password.     Your access code is: [email or web address]  Expires: 2017  9:02 AM     Your access code will  in 90 days. If you need help or a new code, please call your [place] clinic or [phone].        Care EveryWhere ID     This is your Care EveryWhere ID. This could be used by other organizations to access your [place] medical records  [id number]        Your Vitals Were     Pulse Pulse Oximetry                81 96%           Blood Pressure from Last 3 Encounters:   17 112/68   17 112/78   17 (!) 84/56    Weight from Last 3 Encounters:   17 209 lb (94.8 kg)   17 200 lb (90.7 kg)   16 195 lb (88.5 kg)              We Performed the Following     IgA     IgG     IgM     Serum IgE for kiwi: Laboratory Miscellaneous Order     Strep pneumo IgG Abys 23 Serotypes     T cell subset extended profile     Tetanus antibody        Primary Care Provider Office Phone # Fax #    Manny Seymour [phone]       [facility] [address] [place] [address]        Equal Access to Services     LUANNE MILLS AH: Hadii aad ku hadasho Soomaali, waaxda luqadaha, qaybta kaalmada adeegyada, waxay idiin hayaan jerri ramirez ladenisen ah. So [facility] [phone].    ATENCIÓN: Si habla español, tiene a mckeon disposición servicios gratuitos de asistencia lingüística. [facility] [phone].    We comply with applicable federal civil rights laws and [place] laws. We do not discriminate on the basis of race, color, national origin, age, disability sex, sexual orientation or gender identity.            Thank you!     Thank you for choosing [facility]  for your care. Our goal is always to provide you with excellent care. Hearing back from our patients is one way we can continue to improve our services. Please take a few minutes to complete the written survey that you may receive in the mail after your visit with us. Thank  you!             Your Updated Medication List - Protect others around you: Learn how to safely use, store and throw away your medicines at www.disposemymeds.org.          This list is accurate as of: 8/9/17 11:55 AM.  Always use your most recent med list.                   Brand Name Dispense Instructions for use Diagnosis    albuterol 108 (90 BASE) MCG/ACT Inhaler    PROAIR HFA/PROVENTIL HFA/VENTOLIN HFA    1 Inhaler    Inhale 2 puffs into the lungs every 6 hours as needed for shortness of breath / dyspnea or wheezing    SOB (shortness of breath)       baclofen 20 MG tablet    LIORESAL    270 tablet    TAKE 1 TABLET (20 MG) BY MOUTH 4 TIMES DAILY AS NEEDED    Chronic pain syndrome, Quadriplegia (H)       CHOICE DM FIBER-BURST OR      Take  by mouth daily. Am and pm    Diabetes mellitus, type 2 (H), Quadriplegia (H), Osteomyelitis (H), Spasms       COENZYME Q-10 PO      Take 100 mg by mouth 2 times daily.        DULoxetine 60 MG EC capsule    CYMBALTA    90 capsule    TAKE 1 CAPSULE (60 MG) BY MOUTH DAILY    Decubitus ulcer of left ischium, stage 4 (H)       FLORASTOR 250 MG capsule   Generic drug:  saccharomyces boulardii      Take 250 mg by mouth daily        furosemide 20 MG tablet    LASIX    30 tablet    Take 1 tablet (20 mg) by mouth daily as needed    Diastolic dysfunction, SOB (shortness of breath)       glipiZIDE XL 5 MG 24 hr tablet   Generic drug:  glipiZIDE     90 tablet    TAKE 1 TABLET (5 MG) BY MOUTH DAILY    Type 2 diabetes mellitus with other diabetic kidney complication (H)       MIRALAX PO      Take by mouth 2 times daily        Multiple vitamin Tabs      1 TABLET DAILY        order for DME     1 Device    Wheelchair repair [facility] fax#  [phone]    Quadriplegia (H)       * order for DME     30 days    Equipment being ordered:[place] Fax [phone] Reference Number# [id number] Primary Dressing Drawtex 3x39 roll Qty 5 rolls Length of Need: 1 month Frequency of dressing change: daily     Pressure ulcer of trochanteric region of left hip, stage 4 (H)       order for DME     1 REMOVE    Equipment being ordered: Wheelchair Repair        * order for DME     1 Device    Battery for power wheel chair    Quadriplegia (H)       PRADAXA ANTICOAGULANT 150 MG capsule   Generic drug:  dabigatran ANTICOAGULANT      Take 150 mg by mouth every evening Store in original 's bottle or blister pack; use within 120 days of opening.        probiotic Caps      daily        sennosides 8.6 MG tablet    SENOKOT    120 each    Take 2 tablets by mouth 2 times daily    Orthopnea       PILAR FOR MEN Misc     120 Bottle    1 pad as needed.    Quadriplegia (H)       TYLENOL CAPS 500 MG OR     60 Cap    2 CAPSULE EVERY 6 HOURS AS NEEDED (taking 4 x daily as needed)        * Notice:  This list has 2 medication(s) that are the same as other medications prescribed for you. Read the directions carefully, and ask your doctor or other care provider to review them with you.

## 2017-08-09 NOTE — PATIENT INSTRUCTIONS
Allergy Staff Appt Hours Shot Hours Locations    Physician     Chris Villeda DO       Support Staff     Christine CHAIDEZ RN      Soheila SHUKLA MA  Monday:                      [place] 8-7     Tuesday:         [place] 8-5     Wednesday:        [place]: 7-5     Friday:        [place] 7-5   [place]        Monday: 9-6        Friday: 7-2     [place]        Tuesday: 7-12 Thursday: 1-6     [place] Tuesday: 1-6 Wednesday: 11-6 Thursday: 7-12 [facility]  [address] [place] [address]  Appt Line: [phone]  Allergy RN (Monday):  [phone]    [facility]  [address] [place] [address]  Appt Line: [phone]  Allergy RN (Tues & Wed):  [phone]    [facility]  [address] [place] [address]  Appt Line: [phone]  Allergy RN (Friday):  [phone]       Important Scheduling Information  Aspirin Desensitization: Appt will last 2 clinic days. Please call the Allergy RN line for your clinic to schedule. Discontinue antihistamines 7 days prior to the appointment.     Food Challenges: Appt will last 3-4 hours. Please call the Allergy RN line for your clinic to schedule. Discontinue antihistamines 7 days prior to the appointment.     Penicillin Testing: Appt will last 2-3 hours. Please call the Allergy RN line for your clinic to schedule. Discontinue antihistamines 7 days prior to the appointment.     Skin Testing: Appt will about 40 minutes. Call the appointment line for your clinic to schedule. Discontinue antihistamines 7 days prior to the appointment.     Venom Testing: Appt will last 2-3 hours. Please call the Allergy RN line for your clinic to schedule. Discontinue antihistamines 7 days prior to the appointment.     Thank you for trusting us with your Allergy, Asthma, and Immunology care. Please feel free to contact us with any questions or concerns you may have.      - Blood testing today.   - Return to clinic for penicillin skin  testing.

## 2017-08-09 NOTE — ASSESSMENT & PLAN NOTE
Within the last 10 years the patient has had erythematous, raised, pruritic rash on arms, legs and body after receiving penicillin antibiotic. Occurred on the first or second dose. No other systemic symptoms. He is subsequently voided. Multiple antibiotic allergies.    - Discussed with patient and family that only about 15% of those that think they are allergic actually are and of those 15%, 80% outgrow their penicillin allergy within 10 years.   - Fortunately, there is skin testing to ascertain if patient is truly allergic.   - Would recommend patient return to clinic for penicillin testing and oral challenge. Discussed testing with family and patient.
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
## 2017-08-10 LAB
CD19 CELLS # BLD: 87 CELLS/UL (ref 107–698)
CD19 CELLS NFR BLD: 5 % (ref 6–27)
CD3 CELLS # BLD: 1254 CELLS/UL (ref 603–2990)
CD3 CELLS NFR BLD: 77 % (ref 49–84)
CD3+CD4+ CELLS # BLD: 881 CELLS/UL (ref 441–2156)
CD3+CD4+ CELLS NFR BLD: 54 % (ref 28–63)
CD3+CD4+ CELLS/CD3+CD8+ CLL BLD: 2.35 % (ref 1.4–2.6)
CD3+CD8+ CELLS # BLD: 368 CELLS/UL (ref 125–1312)
CD3+CD8+ CELLS NFR BLD: 23 % (ref 10–40)
CD3-CD16+CD56+ CELLS # BLD: 270 CELLS/UL (ref 95–640)
CD3-CD16+CD56+ CELLS NFR BLD: 17 % (ref 4–25)
IFC SPECIMEN: ABNORMAL
IGA SERPL-MCNC: 495 MG/DL (ref 70–380)
IGG SERPL-MCNC: 2040 MG/DL (ref 695–1620)
IGM SERPL-MCNC: 44 MG/DL (ref 60–265)

## 2017-08-11 LAB — C TETANI IGG SER IA-ACNC: 3.67 IU/ML

## 2017-08-12 LAB
DEPRECATED MISC ALLERGEN IGE RAST QL: NORMAL
DEPRECATED S PNEUM 1 IGG SER-MCNC: 10 UG/ML
DEPRECATED S PNEUM12 IGG SER-MCNC: 1.1 UG/ML
DEPRECATED S PNEUM14 IGG SER-MCNC: 11.5 UG/ML
DEPRECATED S PNEUM17 IGG SER-MCNC: 2.2 UG/ML
DEPRECATED S PNEUM19 IGG SER-MCNC: 9.7 UG/ML
DEPRECATED S PNEUM2 IGG SER-MCNC: 1.8 UG/ML
DEPRECATED S PNEUM20 IGG SER-MCNC: 4.7 UG/ML
DEPRECATED S PNEUM22 IGG SER-MCNC: 19.3 UG/ML
DEPRECATED S PNEUM23 IGG SER-MCNC: 29.4 UG/ML
DEPRECATED S PNEUM3 IGG SER-MCNC: 2.3 UG/ML
DEPRECATED S PNEUM34 IGG SER-MCNC: 2.9 UG/ML
DEPRECATED S PNEUM4 IGG SER-MCNC: 1.4 UG/ML
DEPRECATED S PNEUM43 IGG SER-MCNC: 2.3 UG/ML
DEPRECATED S PNEUM5 IGG SER-MCNC: 21.5 UG/ML
DEPRECATED S PNEUM8 IGG SER-MCNC: 1.3 UG/ML
DEPRECATED S PNEUM9 IGG SER-MCNC: 1.9 UG/ML
KIWIFRUIT IGE QN: NORMAL
S PNEUM DA 15B IGG SER-MCNC: 3.4 UG/ML
S PNEUM DA 18C IGG SER-MCNC: 8.7
S PNEUM DA 19A IGG SER-MCNC: 10.6 UG/ML
S PNEUM DA 33F IGG SER-MCNC: 1.8 UG/ML
S PNEUM DA 6B IGG SER-MCNC: 1.9 UG/ML
S PNEUM DA 7F IGG SER-MCNC: 8.2 UG/ML
S PNEUM DA 9V IGG SER-MCNC: 7.6 UG/ML

## 2017-08-13 DIAGNOSIS — D89.2 HYPERGAMMAGLOBULINEMIA: Primary | ICD-10-CM

## 2017-08-13 NOTE — PROGRESS NOTES
Hypergammaglobulinemia noted with decreased cd19. Will send for SPEP with immunofixation. Future orders placed.

## 2017-08-13 NOTE — PROGRESS NOTES
Please call the patient and inform that he had some abnormalities in immunoglobulins. The levels were high as opposed to low. This needs to be further investigated and I am going to order some further blood testing. If you have too high of these levels it can be concerning or could be normal, but we need further lab testing to investigate. Thanks.

## 2017-08-17 DIAGNOSIS — D89.2 HYPERGAMMAGLOBULINEMIA: ICD-10-CM

## 2017-08-17 PROCEDURE — 82784 ASSAY IGA/IGD/IGG/IGM EACH: CPT | Mod: 91 | Performed by: ALLERGY & IMMUNOLOGY

## 2017-08-17 PROCEDURE — 00000402 ZZHCL STATISTIC TOTAL PROTEIN: Performed by: ALLERGY & IMMUNOLOGY

## 2017-08-17 PROCEDURE — 82784 ASSAY IGA/IGD/IGG/IGM EACH: CPT | Performed by: ALLERGY & IMMUNOLOGY

## 2017-08-17 PROCEDURE — 84165 PROTEIN E-PHORESIS SERUM: CPT | Performed by: ALLERGY & IMMUNOLOGY

## 2017-08-17 PROCEDURE — 36415 COLL VENOUS BLD VENIPUNCTURE: CPT | Performed by: ALLERGY & IMMUNOLOGY

## 2017-08-17 PROCEDURE — 86334 IMMUNOFIX E-PHORESIS SERUM: CPT | Performed by: ALLERGY & IMMUNOLOGY

## 2017-08-18 LAB
ALBUMIN SERPL ELPH-MCNC: 3.4 G/DL (ref 3.7–5.1)
ALPHA1 GLOB SERPL ELPH-MCNC: 0.3 G/DL (ref 0.2–0.4)
ALPHA2 GLOB SERPL ELPH-MCNC: 0.7 G/DL (ref 0.5–0.9)
B-GLOBULIN SERPL ELPH-MCNC: 1.1 G/DL (ref 0.6–1)
GAMMA GLOB SERPL ELPH-MCNC: 1.9 G/DL (ref 0.7–1.6)
IGA SERPL-MCNC: 445 MG/DL (ref 70–380)
IGG SERPL-MCNC: 1750 MG/DL (ref 695–1620)
IGM SERPL-MCNC: 40 MG/DL (ref 60–265)
M PROTEIN SERPL ELPH-MCNC: 0 G/DL
PROT PATTERN SERPL ELPH-IMP: ABNORMAL
PROT PATTERN SERPL IFE-IMP: ABNORMAL

## 2017-08-22 ENCOUNTER — OFFICE VISIT (OUTPATIENT)
Dept: ALLERGY | Facility: OTHER | Age: 62
End: 2017-08-22
Payer: COMMERCIAL

## 2017-08-22 VITALS — DIASTOLIC BLOOD PRESSURE: 68 MMHG | OXYGEN SATURATION: 98 % | SYSTOLIC BLOOD PRESSURE: 114 MMHG | HEART RATE: 77 BPM

## 2017-08-22 DIAGNOSIS — D89.0 POLYCLONAL GAMMOPATHY: Primary | ICD-10-CM

## 2017-08-22 DIAGNOSIS — Z88.0 PENICILLIN ALLERGY: ICD-10-CM

## 2017-08-22 PROCEDURE — 99207 ZZC DROP WITH A PROCEDURE: CPT | Mod: 25 | Performed by: ALLERGY & IMMUNOLOGY

## 2017-08-22 PROCEDURE — 95076 INGEST CHALLENGE INI 120 MIN: CPT | Performed by: ALLERGY & IMMUNOLOGY

## 2017-08-22 PROCEDURE — 95018 ALL TSTG PERQ&IQ DRUGS/BIOL: CPT | Performed by: ALLERGY & IMMUNOLOGY

## 2017-08-22 NOTE — ASSESSMENT & PLAN NOTE
Within the last 10 years the patient has had erythematous, raised, pruritic rash on arms, legs and body after receiving penicillin antibiotic. Occurred on the first or second dose. No other systemic symptoms. He has subsequently avoided. Multiple antibiotic allergies.     Passed penicillin skin testing and amoxicillin oral challenge without symptoms. Not penicillin allergic.

## 2017-08-22 NOTE — PATIENT INSTRUCTIONS
Allergy Staff Appt Hours Shot Hours Locations    Physician     Chris Villeda DO       Support Staff     Christine CHAIDEZ RN      Shoeila SHUKLA MA  Monday:                      [place] 8-7     Tuesday:         [place] 8-5 Wednesday:        [place]: 7-5     Friday:        [place] 7-5   [place]        Monday: 9-6        Friday: 7-2     [place]        Tuesday: 7-12 Thursday: 1-6     [place] Tuesday: 1-6 Wednesday: 11-6 Thursday: 7-12 [facility]  [address] [place] [address]  Appt Line: [phone]  Allergy RN (Monday):  [phone]    [facility]  [address] [place] [address]  Appt Line: [phone]  Allergy RN (Tues & Wed):  [phone]    [facility]  [address] [place] [address]  Appt Line: [phone]  Allergy RN (Friday):  [phone]       Important Scheduling Information  Aspirin Desensitization: Appt will last 2 clinic days. Please call the Allergy RN line for your clinic to schedule. Discontinue antihistamines 7 days prior to the appointment.     Food Challenges: Appt will last 3-4 hours. Please call the Allergy RN line for your clinic to schedule. Discontinue antihistamines 7 days prior to the appointment.     Penicillin Testing: Appt will last 2-3 hours. Please call the Allergy RN line for your clinic to schedule. Discontinue antihistamines 7 days prior to the appointment.     Skin Testing: Appt will about 40 minutes. Call the appointment line for your clinic to schedule. Discontinue antihistamines 7 days prior to the appointment.     Venom Testing: Appt will last 2-3 hours. Please call the Allergy RN line for your clinic to schedule. Discontinue antihistamines 7 days prior to the appointment.     Thank you for trusting us with your Allergy, Asthma, and Immunology care. Please feel free to contact us with any questions or concerns you may have.      - Hematology referral.   - Not allergic to penicillin antibiotic.

## 2017-08-22 NOTE — MR AVS SNAPSHOT
After Visit Summary   8/22/2017    Alexandre Velásquez    MRN: [id number]           Patient Information     Date Of Birth          1955        Visit Information        Provider Department      8/22/2017 8:00 AM Chris Villeda DO [facility]        Today's Diagnoses     Polyclonal gammopathy    -  1    Penicillin allergy          Care Instructions    Allergy Staff Appt Hours Shot Hours Locations    Physician     Chris Villeda DO       Support Staff     GUSTABO Castorena MA  Monday:                      [place] 8-7     Tuesday:         [place] 8-5 Wednesday:        [place]: 7-5 Friday:        [place] 7-5 [place] Monday: 9-6 Friday: 7-2     [place]        Tuesday: 7-12 Thursday: 1-6 [place] Tuesday: 1-6 Wednesday: 11-6 Thursday: 7-12 [facility]  [address] [place] [address]  Appt Line: [phone]  Allergy RN (Monday):  [phone]    [facility] ([facility]3]  [address] [place] [address]  Appt Line: [phone]  Allergy RN (Tues & Wed):  [phone]    [facility]  [address] [place] [address]  Appt Line: [phone]  Allergy RN (Friday):  [phone]       Important Scheduling Information  Aspirin Desensitization: Appt will last 2 clinic days. Please call the Allergy RN line for your clinic to schedule. Discontinue antihistamines 7 days prior to the appointment.     Food Challenges: Appt will last 3-4 hours. Please call the Allergy RN line for your clinic to schedule. Discontinue antihistamines 7 days prior to the appointment.     Penicillin Testing: Appt will last 2-3 hours. Please call the Allergy RN line for your clinic to schedule. Discontinue antihistamines 7 days prior to the appointment.     Skin Testing: Appt will about 40 minutes. Call the appointment line for your clinic to schedule. Discontinue antihistamines 7 days prior to the appointment.      Venom Testing: Appt will last 2-3 hours. Please call the Allergy RN line for your clinic to schedule. Discontinue antihistamines 7 days prior to the appointment.     Thank you for trusting us with your Allergy, Asthma, and Immunology care. Please feel free to contact us with any questions or concerns you may have.      - Hematology referral.   - Not allergic to penicillin antibiotic.           Follow-ups after your visit        Additional Services     ONC/HEME ADULT REFERRAL       Your provider has referred you to: [facility]: [facility] [place] [phone]   [email or web address]    Please be aware that coverage of these services is subject to the terms and limitations of your health insurance plan.  Call member services at your health plan with any benefit or coverage questions.      Please bring the following with you to your appointment:    (1) Any X-Rays, CTs or MRIs which have been performed.  Contact the facility where they were done to arrange for  prior to your scheduled appointment.   (2) List of current medications  (3) This referral request   (4) Any documents/labs given to you for this referral                  Your next 10 appointments already scheduled     Sep 21, 2017 11:00 AM CDT   New Sleep Patient with KAYKAY Son   [facility] ([facility])    [address] [place] [id number] [phone]              Who to contact     If you have questions or need follow up information about today's clinic visit or your schedule please contact [facility] directly at [phone].  Normal or non-critical lab and imaging results will be communicated to you by MyChart, letter or phone within 4 business days after the clinic has received the results. If you do not hear from us within 7 days, please contact the clinic through  "[employer] or phone. If you have a critical or abnormal lab result, we will notify you by phone as soon as possible.  Submit refill requests through [employer] or call your pharmacy and they will forward the refill request to us. Please allow 3 business days for your refill to be completed.          Additional Information About Your Visit        [employer] Information     [employer] lets you send messages to your doctor, view your test results, renew your prescriptions, schedule appointments and more. To sign up, go to [email or web address] . Click on \"Log in\" on the left side of the screen, which will take you to the Welcome page. Then click on \"Sign up Now\" on the right side of the page.     You will be asked to enter the access code listed below, as well as some personal information. Please follow the directions to create your username and password.     Your access code is: [email or web address]  Expires: 2017  9:02 AM     Your access code will  in 90 days. If you need help or a new code, please call your [facility] clinic or [phone].        Care EveryWhere ID     This is your Care EveryWhere ID. This could be used by other organizations to access your [facility] medical records  [id number]        Your Vitals Were     Pulse Pulse Oximetry                77 98%           Blood Pressure from Last 3 Encounters:   17 114/68   17 112/68   17 112/78    Weight from Last 3 Encounters:   17 209 lb (94.8 kg)   17 200 lb (90.7 kg)   16 195 lb (88.5 kg)              We Performed the Following     HC ALLG TEST PERQ & IC DRUG/BIOL IMMED REACT W/ I&R     ONC/HEME ADULT REFERRAL        Primary Care Provider Office Phone # Fax #    Manny Seymour [phone]       [facility] [address] [place] [address]        Equal Access to Services     SHIRA MILLS : Hadii sergio catalan hadasho Soomaali, waaxda luqadaha, qaybta kaalmada yvonne, kwame ramirez " laowen eaton. So [facility] [phone].    ATENCIÓN: Si chelseyla good, tiene a mckeon disposición servicios gratuitos de asistencia lingüística. Danita lozano [phone].    We comply with applicable federal civil rights laws and [place] laws. We do not discriminate on the basis of race, color, national origin, age, disability sex, sexual orientation or gender identity.            Thank you!     Thank you for choosing [facility]  for your care. Our goal is always to provide you with excellent care. Hearing back from our patients is one way we can continue to improve our services. Please take a few minutes to complete the written survey that you may receive in the mail after your visit with us. Thank you!             Your Updated Medication List - Protect others around you: Learn how to safely use, store and throw away your medicines at www.disposemymeds.org.          This list is accurate as of: 8/22/17 10:48 AM.  Always use your most recent med list.                   Brand Name Dispense Instructions for use Diagnosis    albuterol 108 (90 BASE) MCG/ACT Inhaler    PROAIR HFA/PROVENTIL HFA/VENTOLIN HFA    1 Inhaler    Inhale 2 puffs into the lungs every 6 hours as needed for shortness of breath / dyspnea or wheezing    SOB (shortness of breath)       baclofen 20 MG tablet    LIORESAL    270 tablet    TAKE 1 TABLET (20 MG) BY MOUTH 4 TIMES DAILY AS NEEDED    Chronic pain syndrome, Quadriplegia (H)       CHOICE DM FIBER-BURST OR      Take  by mouth daily. Am and pm    Diabetes mellitus, type 2 (H), Quadriplegia (H), Osteomyelitis (H), Spasms       COENZYME Q-10 PO      Take 100 mg by mouth 2 times daily.        DULoxetine 60 MG EC capsule    CYMBALTA    90 capsule    TAKE 1 CAPSULE (60 MG) BY MOUTH DAILY    Decubitus ulcer of left ischium, stage 4 (H)       FLORASTOR 250 MG capsule   Generic drug:  saccharomyces boulardii      Take 250 mg by mouth daily        furosemide 20 MG tablet    LASIX    30 tablet    Take 1  tablet (20 mg) by mouth daily as needed    Diastolic dysfunction, SOB (shortness of breath)       glipiZIDE XL 5 MG 24 hr tablet   Generic drug:  glipiZIDE     90 tablet    TAKE 1 TABLET (5 MG) BY MOUTH DAILY    Type 2 diabetes mellitus with other diabetic kidney complication (H)       MIRALAX PO      Take by mouth 2 times daily        Multiple vitamin Tabs      1 TABLET DAILY        order for DME     1 Device    Wheelchair repair [facility] fax#  [phone]    Quadriplegia (H)       * order for DME     30 days    Equipment being ordered:[place] Fax [phone] Reference Number# [id number] Primary Dressing Drawtex 3x39 roll Qty 5 rolls Length of Need: 1 month Frequency of dressing change: daily    Pressure ulcer of trochanteric region of left hip, stage 4 (H)       order for DME     1 REMOVE    Equipment being ordered: Wheelchair Repair        * order for DME     1 Device    Battery for power wheel chair    Quadriplegia (H)       PENICILLIN G SKIN TEST CMPD KIT    ELDER/SABI PROTOCOL    1 kit    Kit to consist of:  Pre-Pen (0.25 mL), penicillin G 100,000 units/mL (5 mL), amoxicillin 250 mg/5mL (80 mL).    Penicillin allergy       PRADAXA ANTICOAGULANT 150 MG capsule   Generic drug:  dabigatran ANTICOAGULANT      Take 150 mg by mouth every evening Store in original 's bottle or blister pack; use within 120 days of opening.        probiotic Caps      daily        sennosides 8.6 MG tablet    SENOKOT    120 each    Take 2 tablets by mouth 2 times daily    Orthopnea       PILAR FOR MEN Misc     120 Bottle    1 pad as needed.    Quadriplegia (H)       TYLENOL CAPS 500 MG OR     60 Cap    2 CAPSULE EVERY 6 HOURS AS NEEDED (taking 4 x daily as needed)        * Notice:  This list has 2 medication(s) that are the same as other medications prescribed for you. Read the directions carefully, and ask your doctor or other care provider to review them with you.
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
## 2017-08-22 NOTE — NURSING NOTE
Patient evaluated by provider prior to start of penicillin challenge.  RN administered skin prick testing and intradermals per physician directed guidelines.  Patient was monitored for 15 minutes after each administered dose.  Both oral doses of Penicillin were given without reaction. Vital signs were recorded. Patient tolerated the testing well. All questions and concerns were addressed in clinic during challenge.       Christine Braswell RN

## 2017-08-22 NOTE — ASSESSMENT & PLAN NOTE
Hematology referral. Elevated IgG, IgA and IgM. SPEP with polyclonal gammopathy with normal immunofixation studies. Suspect secondary to chronic inflammatory state from decubitus ulcers, but will have hematology consult their opinion

## 2017-10-02 ENCOUNTER — TELEPHONE (OUTPATIENT)
Dept: FAMILY MEDICINE | Facility: CLINIC | Age: 62
End: 2017-10-02

## 2017-10-03 ENCOUNTER — OFFICE VISIT (OUTPATIENT)
Dept: INTERNAL MEDICINE | Facility: CLINIC | Age: 62
End: 2017-10-03
Payer: COMMERCIAL

## 2017-10-03 ENCOUNTER — OFFICE VISIT (OUTPATIENT)
Dept: SLEEP MEDICINE | Facility: CLINIC | Age: 62
End: 2017-10-03
Attending: INTERNAL MEDICINE
Payer: COMMERCIAL

## 2017-10-03 VITALS
HEART RATE: 70 BPM | OXYGEN SATURATION: 96 % | BODY MASS INDEX: 29.4 KG/M2 | HEIGHT: 71 IN | SYSTOLIC BLOOD PRESSURE: 87 MMHG | DIASTOLIC BLOOD PRESSURE: 59 MMHG | WEIGHT: 210 LBS

## 2017-10-03 VITALS
OXYGEN SATURATION: 97 % | SYSTOLIC BLOOD PRESSURE: 128 MMHG | RESPIRATION RATE: 16 BRPM | DIASTOLIC BLOOD PRESSURE: 66 MMHG | TEMPERATURE: 96.9 F | HEART RATE: 74 BPM

## 2017-10-03 DIAGNOSIS — G82.50 QUADRIPLEGIA (H): ICD-10-CM

## 2017-10-03 DIAGNOSIS — E87.29 CO2 RETENTION: ICD-10-CM

## 2017-10-03 DIAGNOSIS — G47.33 OSA (OBSTRUCTIVE SLEEP APNEA): ICD-10-CM

## 2017-10-03 DIAGNOSIS — L03.116 LEFT LEG CELLULITIS: Primary | ICD-10-CM

## 2017-10-03 PROCEDURE — 99213 OFFICE O/P EST LOW 20 MIN: CPT | Performed by: INTERNAL MEDICINE

## 2017-10-03 PROCEDURE — 99203 OFFICE O/P NEW LOW 30 MIN: CPT | Performed by: INTERNAL MEDICINE

## 2017-10-03 RX ORDER — DOXYCYCLINE 100 MG/1
CAPSULE ORAL
COMMUNITY
Start: 2017-09-21 | End: 2017-10-03

## 2017-10-03 ASSESSMENT — PAIN SCALES - GENERAL: PAINLEVEL: NO PAIN (0)

## 2017-10-03 NOTE — PATIENT INSTRUCTIONS

## 2017-10-03 NOTE — NURSING NOTE
"Chief Complaint   Patient presents with     Sleep Problem     consult-Co2 build up/recheck bipap [facility]       Initial There were no vitals taken for this visit. Estimated body mass index is 29.15 kg/(m^2) as calculated from the following:    Height as of 9/9/16: 1.803 m (5' 11\").    Weight as of 6/28/17: 94.8 kg (209 lb).  Medication Reconciliation: complete   Neck circumference: 17.5 inches/45 cm    "

## 2017-10-03 NOTE — MR AVS SNAPSHOT
After Visit Summary   10/3/2017    Alexandre Velásquez    MRN: [id number]           Patient Information     Date Of Birth          1955        Visit Information        Provider Department      10/3/2017 12:00 PM Manny Seymour MD [facility]         Follow-ups after your visit        Your next 10 appointments already scheduled     Oct 03, 2017 12:00 PM CDT   SHORT with Manny Seymour MD   [facility] ([facility])    [address] [address]   [phone]            Oct 11, 2017 10:00 AM CDT   SHORT with PFT LAB   [facility] ([facility]    [address] [place] [address]   [phone]            Oct 11, 2017  8:00 PM CDT   PSG Titration w/TCM with BK BED 2   [facility] ([facility])    [address] [place] [facility] [address]  [facility] [id number] [phone]            Oct 25, 2017 12:00 PM CDT   Return Sleep Patient with Kevin Vaugnh MD   [facility] ([facility])    [address] [place] [address] [facility] [address]  [facility] [id number] [phone]              Future tests that were ordered for you today     Open Future Orders        Priority Expected Expires Ordered    Comprehensive Sleep Study Routine  4/1/2018 10/3/2017    ABG-Blood Gas Arterial ([facility] [place]) Routine  12/2/2017 10/3/2017            Who to contact     If you have questions or need follow up information about today's clinic visit or your schedule please contact [facility] directly at [phone].  Normal or non-critical lab and imaging results will be communicated to you by MyChart, letter or phone within 4 business days after the clinic has received the results. If you do not hear from us within 7 days, please contact the clinic through MyChart or phone. If you have a critical or abnormal  "lab result, we will notify you by phone as soon as possible.  Submit refill requests through BloomReach or call your pharmacy and they will forward the refill request to us. Please allow 3 business days for your refill to be completed.          Additional Information About Your Visit        Blue Dot World[employer] Information     BloomReach lets you send messages to your doctor, view your test results, renew your prescriptions, schedule appointments and more. To sign up, go to [email or web address] . Click on \"Log in\" on the left side of the screen, which will take you to the Welcome page. Then click on \"Sign up Now\" on the right side of the page.     You will be asked to enter the access code listed below, as well as some personal information. Please follow the directions to create your username and password.     Your access code is: [email or web address]  Expires: 2018  9:49 AM     Your access code will  in 90 days. If you need help or a new code, please call your [place] clinic or [phone].        Care EveryWhere ID     This is your Care EveryWhere ID. This could be used by other organizations to access your [place] medical records  [id number]        Your Vitals Were     Pulse Temperature Respirations Pulse Oximetry          74 96.9  F (36.1  C) (Temporal) 16 97%         Blood Pressure from Last 3 Encounters:   10/03/17 128/66   10/03/17 (!) 87/59   17 114/68    Weight from Last 3 Encounters:   10/03/17 210 lb (95.3 kg)   17 209 lb (94.8 kg)   17 200 lb (90.7 kg)              Today, you had the following     No orders found for display         Today's Medication Changes          These changes are accurate as of: 10/3/17 11:59 AM.  If you have any questions, ask your nurse or doctor.               These medicines have changed or have updated prescriptions.        Dose/Directions    order for DME   This may have changed:  Another medication with the same name was removed. Continue taking this medication, and " follow the directions you see here.   Used for:  Pressure ulcer of trochanteric region of left hip, stage 4 (H)   Changed by:  Felicitas Dhillon MD        Equipment being ordered:[facility] Fax [phone] Reference Number# [id number] Primary Dressing Drawtex 3x39 roll Qty 5 rolls Length of Need: 1 month Frequency of dressing change: daily   Quantity:  30 days   Refills:  0         Stop taking these medicines if you haven't already. Please contact your care team if you have questions.     order for DME   Stopped by:  Kevin Vaughn MD           order for DME   Stopped by:  Kevin Vaughn MD                    Primary Care Provider Office Phone # Fax #    Manny Seymour [phone]       [facility] [address] [place] [address]        Equal Access to Services     [facility]: Hadii aad ku hadasho Soomaali, waaxda luqadaha, qaybta kaalmada adeegyada, waxay idiin hayaan jerri corado . So [facility] [phone].    ATENCIÓN: Si habla español, tiene a mckeon disposición servicios gratuitos de asistencia lingüística. [facility] [phone].    We comply with applicable federal civil rights laws and [place] laws. We do not discriminate on the basis of race, color, national origin, age, disability, sex, sexual orientation, or gender identity.            Thank you!     Thank you for choosing [facility]  for your care. Our goal is always to provide you with excellent care. Hearing back from our patients is one way we can continue to improve our services. Please take a few minutes to complete the written survey that you may receive in the mail after your visit with us. Thank you!             Your Updated Medication List - Protect others around you: Learn how to safely use, store and throw away your medicines at www.disposemymeds.org.          This list is accurate as of: 10/3/17 11:59 AM.  Always use your most recent med list.                    Brand Name Dispense Instructions for use Diagnosis    albuterol 108 (90 BASE) MCG/ACT Inhaler    PROAIR HFA/PROVENTIL HFA/VENTOLIN HFA    1 Inhaler    Inhale 2 puffs into the lungs every 6 hours as needed for shortness of breath / dyspnea or wheezing    SOB (shortness of breath)       baclofen 20 MG tablet    LIORESAL    270 tablet    TAKE 1 TABLET (20 MG) BY MOUTH 4 TIMES DAILY AS NEEDED    Chronic pain syndrome, Quadriplegia (H)       CHOICE DM FIBER-BURST OR      Take  by mouth daily. Am and pm    Diabetes mellitus, type 2 (H), Quadriplegia (H), Osteomyelitis (H), Spasms       COENZYME Q-10 PO      Take 100 mg by mouth 2 times daily.        doxycycline Monohydrate 100 MG Caps      Take two capsules by mouth twice daily for 3 days, then two daily for 7 days.        DULoxetine 60 MG EC capsule    CYMBALTA    90 capsule    TAKE 1 CAPSULE (60 MG) BY MOUTH DAILY    Decubitus ulcer of left ischium, stage 4 (H)       FLORASTOR 250 MG capsule   Generic drug:  saccharomyces boulardii      Take 250 mg by mouth daily        furosemide 20 MG tablet    LASIX    30 tablet    Take 1 tablet (20 mg) by mouth daily as needed    Diastolic dysfunction, SOB (shortness of breath)       glipiZIDE XL 5 MG 24 hr tablet   Generic drug:  glipiZIDE     90 tablet    TAKE 1 TABLET (5 MG) BY MOUTH DAILY    Type 2 diabetes mellitus with other diabetic kidney complication       MIRALAX PO      Take by mouth 2 times daily        Multiple vitamin Tabs      1 TABLET DAILY        order for DME     30 days    Equipment being ordered:[facility] Fax [phone] Reference Number# [id number] Primary Dressing Drawtex 3x39 roll Qty 5 rolls Length of Need: 1 month Frequency of dressing change: daily    Pressure ulcer of trochanteric region of left hip, stage 4 (H)       PENICILLIN G SKIN TEST CMPD KIT    ELDER/SABI PROTOCOL    1 kit    Kit to consist of:  Pre-Pen (0.25 mL), penicillin G 100,000 units/mL (5 mL), amoxicillin 250 mg/5mL (80 mL).    Penicillin  allergy       PRADAXA ANTICOAGULANT 150 MG capsule   Generic drug:  dabigatran ANTICOAGULANT      Take 150 mg by mouth every evening Store in original 's bottle or blister pack; use within 120 days of opening.        probiotic Caps      daily        sennosides 8.6 MG tablet    SENOKOT    120 each    Take 2 tablets by mouth 2 times daily    Orthopnea       PILAR FOR MEN Misc     120 Bottle    1 pad as needed.    Quadriplegia (H)       TYLENOL CAPS 500 MG OR     60 Cap    2 CAPSULE EVERY 6 HOURS AS NEEDED (taking 4 x daily as needed)

## 2017-10-03 NOTE — MR AVS SNAPSHOT
After Visit Summary   10/3/2017    Alexandre Velásquez    MRN: [id number]           Patient Information     Date Of Birth          1955        Visit Information        Provider Department      10/3/2017 9:00 AM Kevin Vaughn MD [facility]        Today's Diagnoses     CO2 retention        GURJIT (obstructive sleep apnea)        Quadriplegia (H)          Care Instructions      Your BMI is Body mass index is 29.29 kg/(m^2).  Weight management is a personal decision.  If you are interested in exploring weight loss strategies, the following discussion covers the approaches that may be successful. Body mass index (BMI) is one way to tell whether you are at a healthy weight, overweight, or obese. It measures your weight in relation to your height.  A BMI of 18.5 to 24.9 is in the healthy range. A person with a BMI of 25 to 29.9 is considered overweight, and someone with a BMI of 30 or greater is considered obese. More than two-thirds of American adults are considered overweight or obese.  Being overweight or obese increases the risk for further weight gain. Excess weight may lead to heart disease and diabetes.  Creating and following plans for healthy eating and physical activity may help you improve your health.  Weight control is part of healthy lifestyle and includes exercise, emotional health, and healthy eating habits. Careful eating habits lifelong are the mainstay of weight control. Though there are significant health benefits from weight loss, long-term weight loss with diet alone may be very difficult to achieve- studies show long-term success with dietary management in less than 10% of people. Attaining a healthy weight may be especially difficult to achieve in those with severe obesity. In some cases, medications, devices and surgical management might be considered.  What can you do?  If you are overweight or obese and are interested in methods for weight loss, you  should discuss this with your provider.     Consider reducing daily calorie intake by 500 calories.     Keep a food journal.     Avoiding skipping meals, consider cutting portions instead.    Diet combined with exercise helps maintain muscle while optimizing fat loss. Strength training is particularly important for building and maintaining muscle mass. Exercise helps reduce stress, increase energy, and improves fitness. Increasing exercise without diet control, however, may not burn enough calories to loose weight.       Start walking three days a week 10-20 minutes at a time    Work towards walking thirty minutes five days a week     Eventually, increase the speed of your walking for 1-2 minutes at time    In addition, we recommend that you review healthy lifestyles and methods for weight loss available through the National Institutes of Health patient information sites:  http://win.niddk.nih.gov/publications/index.htm    And look into health and wellness programs that may be available through your health insurance provider, employer, local community center, or opal club.    Weight management plan: Patient was referred to their PCP to discuss a diet and exercise plan.              Follow-ups after your visit        Your next 10 appointments already scheduled     Oct 03, 2017 12:00 PM CDT   SHORT with Manny Seyomur MD   [facility] ([facility])    [address] [facility] [address]   [phone]            Oct 11, 2017 10:00 AM CDT   SHORT with PFT LAB   [facility] ([facility])    [address] [place] [id number] [phone]            Oct 11, 2017  8:00 PM CDT   PSG Titration w/TCM with BK BED 2   [facility] ([facility])    [address] [id number] [phone]            Oct 25, 2017 12:00 PM CDT   Return Sleep Patient with Kevin Aguirre  "Arnaldo Vaughn MD   [facility] ([facility])    [address] [place] [id number] [phone]              Future tests that were ordered for you today     Open Future Orders        Priority Expected Expires Ordered    Comprehensive Sleep Study Routine  2018 10/3/2017    ABG-Blood Gas Arterial ([place] / [place]) Routine  2017 10/3/2017            Who to contact     If you have questions or need follow up information about today's clinic visit or your schedule please contact [facility] directly at [phone].  Normal or non-critical lab and imaging results will be communicated to you by MyChart, letter or phone within 4 business days after the clinic has received the results. If you do not hear from us within 7 days, please contact the clinic through Silvigenhart or phone. If you have a critical or abnormal lab result, we will notify you by phone as soon as possible.  Submit refill requests through [employer]oWake or call your pharmacy and they will forward the refill request to us. Please allow 3 business days for your refill to be completed.          Additional Information About Your Visit        Silvigenhart Information     ChronoWake lets you send messages to your doctor, view your test results, renew your prescriptions, schedule appointments and more. To sign up, go to [email or web address] . Click on \"Log in\" on the left side of the screen, which will take you to the Welcome page. Then click on \"Sign up Now\" on the right side of the page.     You will be asked to enter the access code listed below, as well as some personal information. Please follow the directions to create your username and password.     Your access code is: [email or web address]  Expires: 2018  9:49 AM     Your access code will  in 90 days. If you need help or a new code, please call your [place] clinic or [phone].        Care EveryWhere ID     This " "is your Care EveryWhere ID. This could be used by other organizations to access your [place] medical records  [id number]        Your Vitals Were     Pulse Height Pulse Oximetry BMI (Body Mass Index)          70 1.803 m (5' 11\") 96% 29.29 kg/m2         Blood Pressure from Last 3 Encounters:   10/03/17 (!) 87/59   08/22/17 114/68   08/09/17 112/68    Weight from Last 3 Encounters:   10/03/17 95.3 kg (210 lb)   06/28/17 94.8 kg (209 lb)   03/21/17 90.7 kg (200 lb)              We Performed the Following     SLEEP EVALUATION & MANAGEMENT REFERRAL - ADULT          Today's Medication Changes          These changes are accurate as of: 10/3/17  9:59 AM.  If you have any questions, ask your nurse or doctor.               These medicines have changed or have updated prescriptions.        Dose/Directions    order for DME   This may have changed:  Another medication with the same name was removed. Continue taking this medication, and follow the directions you see here.   Used for:  Pressure ulcer of trochanteric region of left hip, stage 4 (H)   Changed by:  Felicitas Dhillon MD        Equipment being ordered:[facility] Fax [phone] Reference Number# [id number] Primary Dressing Drawtex 3x39 roll Qty 5 rolls Length of Need: 1 month Frequency of dressing change: daily   Quantity:  30 days   Refills:  0         Stop taking these medicines if you haven't already. Please contact your care team if you have questions.     order for DME   Stopped by:  Kevin Vaughn MD           order for DME   Stopped by:  Kevin Vaughn MD                    Primary Care Provider Office Phone # Fax #    Manny Seymour [phone]       [facility] [address] [place] [address]        Equal Access to Services     [facility] : Hadii sergio locoo Somaggy, waaxda luqadaha, qaybta kaalmada adeegyada, kwame eaton. So [facility] [phone].    ATENCIÓN: Si habla " español, tiene a mckeno disposición servicios gratuitos de asistencia lingüística. Danita lozano [phone].    We comply with applicable federal civil rights laws and [place] laws. We do not discriminate on the basis of race, color, national origin, age, disability, sex, sexual orientation, or gender identity.            Thank you!     Thank you for choosing [facility]  for your care. Our goal is always to provide you with excellent care. Hearing back from our patients is one way we can continue to improve our services. Please take a few minutes to complete the written survey that you may receive in the mail after your visit with us. Thank you!             Your Updated Medication List - Protect others around you: Learn how to safely use, store and throw away your medicines at www.disposemymeds.org.          This list is accurate as of: 10/3/17  9:59 AM.  Always use your most recent med list.                   Brand Name Dispense Instructions for use Diagnosis    albuterol 108 (90 BASE) MCG/ACT Inhaler    PROAIR HFA/PROVENTIL HFA/VENTOLIN HFA    1 Inhaler    Inhale 2 puffs into the lungs every 6 hours as needed for shortness of breath / dyspnea or wheezing    SOB (shortness of breath)       baclofen 20 MG tablet    LIORESAL    270 tablet    TAKE 1 TABLET (20 MG) BY MOUTH 4 TIMES DAILY AS NEEDED    Chronic pain syndrome, Quadriplegia (H)       CHOICE DM FIBER-BURST OR      Take  by mouth daily. Am and pm    Diabetes mellitus, type 2 (H), Quadriplegia (H), Osteomyelitis (H), Spasms       COENZYME Q-10 PO      Take 100 mg by mouth 2 times daily.        doxycycline Monohydrate 100 MG Caps      Take two capsules by mouth twice daily for 3 days, then two daily for 7 days.        DULoxetine 60 MG EC capsule    CYMBALTA    90 capsule    TAKE 1 CAPSULE (60 MG) BY MOUTH DAILY    Decubitus ulcer of left ischium, stage 4 (H)       FLORASTOR 250 MG capsule   Generic drug:  saccharomyces boulardii      Take 250 mg by  mouth daily        furosemide 20 MG tablet    LASIX    30 tablet    Take 1 tablet (20 mg) by mouth daily as needed    Diastolic dysfunction, SOB (shortness of breath)       glipiZIDE XL 5 MG 24 hr tablet   Generic drug:  glipiZIDE     90 tablet    TAKE 1 TABLET (5 MG) BY MOUTH DAILY    Type 2 diabetes mellitus with other diabetic kidney complication       MIRALAX PO      Take by mouth 2 times daily        Multiple vitamin Tabs      1 TABLET DAILY        order for DME     30 days    Equipment being ordered:[employer] Fax [phone] Reference Number# [id number] Primary Dressing Drawtex 3x39 roll Qty 5 rolls Length of Need: 1 month Frequency of dressing change: daily    Pressure ulcer of trochanteric region of left hip, stage 4 (H)       PENICILLIN G SKIN TEST CMPD KIT    ELDER/SABI PROTOCOL    1 kit    Kit to consist of:  Pre-Pen (0.25 mL), penicillin G 100,000 units/mL (5 mL), amoxicillin 250 mg/5mL (80 mL).    Penicillin allergy       PRADAXA ANTICOAGULANT 150 MG capsule   Generic drug:  dabigatran ANTICOAGULANT      Take 150 mg by mouth every evening Store in original 's bottle or blister pack; use within 120 days of opening.        probiotic Caps      daily        sennosides 8.6 MG tablet    SENOKOT    120 each    Take 2 tablets by mouth 2 times daily    Orthopnea       PILAR FOR MEN Misc     120 Bottle    1 pad as needed.    Quadriplegia (H)       TYLENOL CAPS 500 MG OR     60 Cap    2 CAPSULE EVERY 6 HOURS AS NEEDED (taking 4 x daily as needed)
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
## 2017-10-03 NOTE — NURSING NOTE
"Chief Complaint   Patient presents with     Cellulitis     cellulitis left leg       Initial /66  Pulse 74  Temp 96.9  F (36.1  C) (Temporal)  Resp 16  SpO2 97% Estimated body mass index is 29.29 kg/(m^2) as calculated from the following:    Height as of an earlier encounter on 10/3/17: 5' 11\" (1.803 m).    Weight as of an earlier encounter on 10/3/17: 210 lb (95.3 kg).  Medication Reconciliation: complete    "
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
## 2017-10-05 ENCOUNTER — TRANSFERRED RECORDS (OUTPATIENT)
Dept: HEALTH INFORMATION MANAGEMENT | Facility: CLINIC | Age: 62
End: 2017-10-05

## 2017-10-11 ENCOUNTER — OFFICE VISIT (OUTPATIENT)
Dept: NURSING | Facility: CLINIC | Age: 62
End: 2017-10-11
Payer: COMMERCIAL

## 2017-10-11 ENCOUNTER — THERAPY VISIT (OUTPATIENT)
Dept: SLEEP MEDICINE | Facility: CLINIC | Age: 62
End: 2017-10-11
Payer: COMMERCIAL

## 2017-10-11 DIAGNOSIS — E87.29 CO2 RETENTION: ICD-10-CM

## 2017-10-11 DIAGNOSIS — G82.50 QUADRIPLEGIA (H): ICD-10-CM

## 2017-10-11 DIAGNOSIS — G47.33 OSA (OBSTRUCTIVE SLEEP APNEA): ICD-10-CM

## 2017-10-11 DIAGNOSIS — G47.36 SLEEP-RELATED HYPOVENTILATION DUE TO NEUROMUSCULAR DISORDER (H): Primary | ICD-10-CM

## 2017-10-11 DIAGNOSIS — G70.9 SLEEP-RELATED HYPOVENTILATION DUE TO NEUROMUSCULAR DISORDER (H): Primary | ICD-10-CM

## 2017-10-11 LAB
% O2SAT: 94 % (ref 92–100)
BASE EXCESS BLDA CALC-SCNC: 4 MMOL/L (ref -9–1.8)
FIO2: ABNORMAL
HCO3 BLDA-SCNC: 29.6 MMOL/L (ref 21–28)
PCO2 BLDA: 52.9 MMHG (ref 35–45)
PH BLDA: 7.36 [PH] (ref 7.35–7.45)
PO2 BLD: 75 MMHG (ref 80–105)

## 2017-10-11 PROCEDURE — 95811 POLYSOM 6/>YRS CPAP 4/> PARM: CPT | Performed by: INTERNAL MEDICINE

## 2017-10-11 PROCEDURE — 36600 WITHDRAWAL OF ARTERIAL BLOOD: CPT | Performed by: INTERNAL MEDICINE

## 2017-10-11 PROCEDURE — 2894A BLOOD GAS ARTERIAL AND OXYHGB: CPT

## 2017-10-11 PROCEDURE — 82805 BLOOD GASES W/O2 SATURATION: CPT

## 2017-10-11 PROCEDURE — 99207 ZZC NO CHARGE LOS: CPT

## 2017-10-11 PROCEDURE — 99207 ZZC NO CHARGE NURSE ONLY: CPT | Performed by: INTERNAL MEDICINE

## 2017-10-11 NOTE — Clinical Note
All Night titration PSG is scored and ready for interpretation with possible mobitz type 1 wenckebach.  Thank you  Jon

## 2017-10-11 NOTE — MR AVS SNAPSHOT
After Visit Summary   10/11/2017    Alexandre Velásquez    MRN: [id number]           Patient Information     Date Of Birth          1955        Visit Information        Provider Department      10/11/2017 10:00 AM PFT LAB [facility]        Today's Diagnoses     CO2 retention        GURJIT (obstructive sleep apnea)        Quadriplegia (H)           Follow-ups after your visit        Your next 10 appointments already scheduled     Oct 11, 2017  8:00 PM CDT   PSG Titration w/TCM with BK BED 2   [facility] ([facility])    [address] [address] [id number] [phone]            Oct 25, 2017 12:00 PM CDT   Return Sleep Patient with Kevin Vaughn MD   [facility] ([facility])    [address] [place] [id number] [phone]              Who to contact     If you have questions or need follow up information about today's clinic visit or your schedule please contact [facility] directly at [phone].  Normal or non-critical lab and imaging results will be communicated to you by Ad Dynamohart, letter or phone within 4 business days after the clinic has received the results. If you do not hear from us within 7 days, please contact the clinic through Ad Dynamohart or phone. If you have a critical or abnormal lab result, we will notify you by phone as soon as possible.  Submit refill requests through iConclude or call your pharmacy and they will forward the refill request to us. Please allow 3 business days for your refill to be completed.          Additional Information About Your Visit        Ad Dynamohart Information     iConclude is an electronic gateway that provides easy, online access to your medical records. With iConclude, you can request a clinic appointment, read your test results, renew a prescription or communicate with your  care team.     To sign up for [email or web address] visit the website at [email or web address]   You will be asked to enter the access code listed below, as well as some personal information. Please follow the directions to create your username and password.     Your access code is: [email or web address]  Expires: 2018  9:49 AM     Your access code will  in 90 days. If you need help or a new code, please contact your [facility] or call [phone] for assistance.        Care EveryWhere ID     This is your Care EveryWhere ID. This could be used by other organizations to access your [place] medical records  [id number]         Blood Pressure from Last 3 Encounters:   10/03/17 128/66   10/03/17 (!) 87/59   17 114/68    Weight from Last 3 Encounters:   10/03/17 95.3 kg (210 lb)   17 94.8 kg (209 lb)   17 90.7 kg (200 lb)              We Performed the Following     ABG-Blood Gas Arterial ([facility])     Blood gas arterial and oxyhgb        Primary Care Provider Office Phone # Fax #    Manny Seymour [phone]       [facility] [address] [place] [address]        Equal Access to Services     SHIRA MILLS : Hadii aad ku hadasho Soomaali, waaxda luqadaha, qaybta kaalmada adeegyada, waxay umu haysubhash eaton. So [facility] [phone].    ATENCIÓN: Si habla español, tiene a mckeon disposición servicios gratuitos de asistencia lingüística. Llame al [phone].    We comply with applicable federal civil rights laws and [place] laws. We do not discriminate on the basis of race, color, national origin, age, disability, sex, sexual orientation, or gender identity.            Thank you!     Thank you for choosing [facility]  for your care. Our goal is always to provide you with excellent care. Hearing back from our patients is one way we can continue to improve our services. Please take a few minutes to  complete the written survey that you may receive in the mail after your visit with us. Thank you!             Your Updated Medication List - Protect others around you: Learn how to safely use, store and throw away your medicines at www.disposemymeds.org.          This list is accurate as of: 10/11/17 11:06 AM.  Always use your most recent med list.                   Brand Name Dispense Instructions for use Diagnosis    albuterol 108 (90 BASE) MCG/ACT Inhaler    PROAIR HFA/PROVENTIL HFA/VENTOLIN HFA    1 Inhaler    Inhale 2 puffs into the lungs every 6 hours as needed for shortness of breath / dyspnea or wheezing    SOB (shortness of breath)       amoxicillin-clavulanate 875-125 MG per tablet    AUGMENTIN    20 tablet    Take 1 tablet by mouth 2 times daily    Left leg cellulitis       baclofen 20 MG tablet    LIORESAL    270 tablet    TAKE 1 TABLET (20 MG) BY MOUTH 4 TIMES DAILY AS NEEDED    Chronic pain syndrome, Quadriplegia (H)       CHOICE DM FIBER-BURST OR      Take  by mouth daily. Am and pm    Diabetes mellitus, type 2 (H), Quadriplegia (H), Osteomyelitis (H), Spasms       COENZYME Q-10 PO      Take 100 mg by mouth 2 times daily.        DULoxetine 60 MG EC capsule    CYMBALTA    90 capsule    TAKE 1 CAPSULE (60 MG) BY MOUTH DAILY    Decubitus ulcer of left ischium, stage 4 (H)       FLORASTOR 250 MG capsule   Generic drug:  saccharomyces boulardii      Take 250 mg by mouth daily        furosemide 20 MG tablet    LASIX    30 tablet    Take 1 tablet (20 mg) by mouth daily as needed    Diastolic dysfunction, SOB (shortness of breath)       glipiZIDE XL 5 MG 24 hr tablet   Generic drug:  glipiZIDE     90 tablet    TAKE 1 TABLET (5 MG) BY MOUTH DAILY    Type 2 diabetes mellitus with other diabetic kidney complication       MIRALAX PO      Take by mouth 2 times daily        Multiple vitamin Tabs      1 TABLET DAILY        order for DME     30 days    Equipment being ordered:[facility] Fax [phone] Reference Number#  [id number] Primary Dressing Drawtex 3x39 roll Qty 5 rolls Length of Need: 1 month Frequency of dressing change: daily    Pressure ulcer of trochanteric region of left hip, stage 4 (H)       probiotic Caps      daily        sennosides 8.6 MG tablet    SENOKOT    120 each    Take 2 tablets by mouth 2 times daily    Orthopnea       PILAR FOR MEN Misc     120 Bottle    1 pad as needed.    Quadriplegia (H)       TYLENOL CAPS 500 MG OR     60 Cap    2 CAPSULE EVERY 6 HOURS AS NEEDED (taking 4 x daily as needed)

## 2017-10-11 NOTE — PROGRESS NOTES
ABG Note: Obtained ABG from left radial artery. Modified Roman's test performed prior to blood draw. Zero adverse events noted.

## 2017-10-11 NOTE — MR AVS SNAPSHOT
"              After Visit Summary   10/11/2017    Alexandre Velásquez    MRN: [id number]           Patient Information     Date Of Birth          1955        Visit Information        Provider Department      10/11/2017 8:00 PM BK BED 2 [facility]        Today's Diagnoses     CO2 retention        GURJIT (obstructive sleep apnea)        Quadriplegia (H)           Follow-ups after your visit        Your next 10 appointments already scheduled     Oct 25, 2017 12:00 PM CDT   Return Sleep Patient with Kevin Vaughn MD   [facility] ([facility])    [address] [place] [address] [id number] [phone]              Who to contact     If you have questions or need follow up information about today's clinic visit or your schedule please contact [facility] directly at [phone].  Normal or non-critical lab and imaging results will be communicated to you by MyChart, letter or phone within 4 business days after the clinic has received the results. If you do not hear from us within 7 days, please contact the clinic through [employer] or phone. If you have a critical or abnormal lab result, we will notify you by phone as soon as possible.  Submit refill requests through [employer] or call your pharmacy and they will forward the refill request to us. Please allow 3 business days for your refill to be completed.          Additional Information About Your Visit        MyChart Information     [employer] lets you send messages to your doctor, view your test results, renew your prescriptions, schedule appointments and more. To sign up, go to [email or web address] . Click on \"Log in\" on the left side of the screen, which will take you to the Welcome page. Then click on \"Sign up Now\" on the right side of the page.     You will be asked to enter the access code listed below, as well as some personal information. Please follow the " directions to create your username and password.     Your access code is: [email or web address]  Expires: 2018  9:49 AM     Your access code will  in 90 days. If you need help or a new code, please call your [place] clinic or [phone].        Care EveryWhere ID     This is your Care EveryWhere ID. This could be used by other organizations to access your [place] medical records  [id number]         Blood Pressure from Last 3 Encounters:   10/03/17 128/66   10/03/17 (!) 87/59   17 114/68    Weight from Last 3 Encounters:   10/03/17 95.3 kg (210 lb)   17 94.8 kg (209 lb)   17 90.7 kg (200 lb)              We Performed the Following     Comprehensive Sleep Study        Primary Care Provider Office Phone # Fax #    Manny Seymour [phone]       [facility] [address] [place] [address]        Equal Access to Services     LU[facility]NE [facility] : Hadii aad ku hadasho Soomaali, waaxda luqadaha, qaybta kaalmada adeegyada, waxay aquilinoin haysubhash corado . So [facility] [phone].    ATENCIÓN: Si habla español, tiene a mckeon disposición servicios gratuitos de asistencia lingüística. LlRegency Hospital [facility] [phone].    We comply with applicable federal civil rights laws and [place] laws. We do not discriminate on the basis of race, color, national origin, age, disability, sex, sexual orientation, or gender identity.            Thank you!     Thank you for choosing [facility]  for your care. Our goal is always to provide you with excellent care. Hearing back from our patients is one way we can continue to improve our services. Please take a few minutes to complete the written survey that you may receive in the mail after your visit with us. Thank you!             Your Updated Medication List - Protect others around you: Learn how to safely use, store and throw away your medicines at www.disposemymeds.org.          This list is accurate as of: 10/11/17 11:59 PM.   Always use your most recent med list.                   Brand Name Dispense Instructions for use Diagnosis    albuterol 108 (90 BASE) MCG/ACT Inhaler    PROAIR HFA/PROVENTIL HFA/VENTOLIN HFA    1 Inhaler    Inhale 2 puffs into the lungs every 6 hours as needed for shortness of breath / dyspnea or wheezing    SOB (shortness of breath)       amoxicillin-clavulanate 875-125 MG per tablet    AUGMENTIN    20 tablet    Take 1 tablet by mouth 2 times daily    Left leg cellulitis       baclofen 20 MG tablet    LIORESAL    270 tablet    TAKE 1 TABLET (20 MG) BY MOUTH 4 TIMES DAILY AS NEEDED    Chronic pain syndrome, Quadriplegia (H)       CHOICE DM FIBER-BURST OR      Take  by mouth daily. Am and pm    Diabetes mellitus, type 2 (H), Quadriplegia (H), Osteomyelitis (H), Spasms       COENZYME Q-10 PO      Take 100 mg by mouth 2 times daily.        DULoxetine 60 MG EC capsule    CYMBALTA    90 capsule    TAKE 1 CAPSULE (60 MG) BY MOUTH DAILY    Decubitus ulcer of left ischium, stage 4 (H)       FLORASTOR 250 MG capsule   Generic drug:  saccharomyces boulardii      Take 250 mg by mouth daily        furosemide 20 MG tablet    LASIX    30 tablet    Take 1 tablet (20 mg) by mouth daily as needed    Diastolic dysfunction, SOB (shortness of breath)       glipiZIDE XL 5 MG 24 hr tablet   Generic drug:  glipiZIDE     90 tablet    TAKE 1 TABLET (5 MG) BY MOUTH DAILY    Type 2 diabetes mellitus with other diabetic kidney complication       MIRALAX PO      Take by mouth 2 times daily        Multiple vitamin Tabs      1 TABLET DAILY        order for DME     30 days    Equipment being ordered:[facility] Fax [phone] Reference Number# [id number] Primary Dressing Drawtex 3x39 roll Qty 5 rolls Length of Need: 1 month Frequency of dressing change: daily    Pressure ulcer of trochanteric region of left hip, stage 4 (H)       probiotic Caps      daily        sennosides 8.6 MG tablet    SENOKOT    120 each    Take 2 tablets by mouth 2 times daily     Orthopnea       PILAR FOR MEN Misc     120 Bottle    1 pad as needed.    Quadriplegia (H)       TYLENOL CAPS 500 MG OR     60 Cap    2 CAPSULE EVERY 6 HOURS AS NEEDED (taking 4 x daily as needed)

## 2017-10-17 DIAGNOSIS — R06.02 SOB (SHORTNESS OF BREATH): ICD-10-CM

## 2017-10-17 NOTE — TELEPHONE ENCOUNTER
Last Written Prescription Date: 9/9/16  Last Fill Quantity: 1, # refills: 1    Last Office Visit with [facility] or [facility] prescribing provider:  10/3/17   Future Office Visit:       Date of Last Asthma Action Plan Letter:   There are no preventive care reminders to display for this patient.   Asthma Control Test: No flowsheet data found.    Date of Last Spirometry Test:   No results found for this or any previous visit.

## 2017-10-19 NOTE — TELEPHONE ENCOUNTER
Routing [employer] refill request to provider for review/approval because:  Drug not active on patient's medication list Last filled 9/9/16 for 1 inhaler and 1 refill  GUSTABO Gentile

## 2017-10-20 PROBLEM — G47.36 SLEEP-RELATED HYPOVENTILATION DUE TO NEUROMUSCULAR DISORDER (H): Status: ACTIVE | Noted: 2017-10-20

## 2017-10-20 PROBLEM — G70.9 SLEEP-RELATED HYPOVENTILATION DUE TO NEUROMUSCULAR DISORDER (H): Status: ACTIVE | Noted: 2017-10-20

## 2017-10-20 RX ORDER — ALBUTEROL SULFATE 90 UG/1
AEROSOL, METERED RESPIRATORY (INHALATION)
Qty: 1 G | Refills: 3 | Status: SHIPPED | OUTPATIENT
Start: 2017-10-20

## 2017-10-20 NOTE — PROGRESS NOTES
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  COPD has not been diagnosed and there is no suspicion of COPD, therefore, COPD is not expected to be contributing to this issues.    Currently on Bilevel auto 20 / 5 with PS max 8, machine has been running around 9/6 typically.    At [facility] on 7/7/2017 - ABG while inpatient showed:  Ph - 7.3  PCO2 - 59  Bicarb - 28  PO2 - 83  Base Excess - 1.1    Repeat the following day showed:  Ph - 7.32  PCO2 - 55  Bicarb - 28  PO2 - 139  Base Excess - 1.0    Per this information, and information seen on Polysomnography, patient will need nocturnal ventilation above what bilevel or bilevel auto can provide.  During NREM, higher pressures are causing central apneas and during REM, his volumes are too low to maintain normal gas exchange.  Thus we need a device that provides sufficient back-up rate for NREM and assured TV during REM.  Recommend AVAPS for this purpose. EPAP 6, IPAP 5 - 14, , BR 10.    Kevin Vaughn MD, Sleep Physician

## 2017-10-20 NOTE — PROCEDURES
"SLEEP STUDY INTERPRETATION  TITRATION STUDY      Patient: Alexandre Velásquez  YOB: 1955  Study Date: 10/11/2017  MRN: [id number]  Referring Provider: Manny Seymour  Ordering Provider: Kevin Vaughn MD    Indications for Polysomnography: The patient is a 62 y old Male who is 5' 11\" and weighs 210.0 lbs.  His BMI is 29.4, [place] sleepiness scale is 10.0 and neck size is 45.0.  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  A diagnostic polysomnogram PAP titration was performed for GURJIT, hypoventilation and hypoxemia.    Polysomnogram Data:  A full night polysomnogram recorded the standard physiologic parameters including EEG, EOG, EMG, ECG, nasal and oral airflow.  Respiratory parameters of chest and abdominal movements were recorded with respiratory inductance plethysmography.  Oxygen saturation was recorded by pulse oximetry.      Treatment PSG:  Sleep Architecture: Decreased sleep latency without sleep aid, increased arousal index, and normal sleep efficiency.  The total recording time of the study was 439.2 minutes.  The total sleep time was 409.0 minutes.  Sleep latency was decreased at 2.5 minutes without the use of a sleep aid.  REM latency was 164.0 minutes.  Arousal index was increased at 19.2 arousals per hour.  Sleep efficiency was normal at 93.1%.  Wake after sleep onset was 27.0 minutes.   The patient spent 3.9% of total sleep time in Stage N1, 27.1% in Stage N2, 46.5% in Stage N3 and 22.5% in REM.     Respiration: Obstructive sleep apnea and REM-related hypoventilation, with emergence of central sleep apnea on higher Bilevel S pressures.  Given neuromuscular disorder (c4-5 quadriparesis) with daytime hypercapnia based on ABG and lack of COPD, patient was started on Bilevel ST with back-up rate of 10 bpm which improved NREM central apneas.  However, at this setting REM sleep was not seen, " although suspicion for hypoventilation persists as issue was with REM tidal volumes rather than rate.  Tidal volumes during REM were 150 - 300 while during NREM they were closer to 450 - 500 mL.    The patient was titrated at pressures ranging from 5 cmH2O up to 13/6/10 cmH2O.  The optimal pressure achieved was 13/6/10 cmH2O with a residual AHI of 0.8 events per hour.  Time in REM supine on final pressure was - minutes.     This titration was considered: optimal (residual AHI < 5/hr including REM-supine sleep at final pressure).     Respiratory rate and pattern - was normal.    Sustained Sleep Associated Hypoventilation - Transcutaneous carbon dioxide monitoring was used, and significant hypoventilation was present with a maximum change of 10 mmHg (max value of 54 mmHg).    Wake pre-study ABG results: pH 7.355, PCO2 52.9, PO2 75, HCO3 29.6, and O2 Sat 94%    Sleep Associated Hypoxemia - (Greater than 5 minutes O2 sat below 89%) was present.  Baseline oxygen saturation was 94.7%. Lowest oxygen saturation was 78.9%.  Time spent less than or equal to 88% was 5.3 minutes.  Time spent less than or equal to 89% was 9.6 minutes.    Movement Activity: No significant movement abnormalities noted.    Periodic Limb Activity - There were - PLMs during the entire study.     REM EMG Activity - Excessive muscle activity was not present.    Nocturnal Behavior - Abnormal sleep related behaviors were not noted.    Bruxism - None apparent.    Cardiac Summary: Progressively lengthening PA intervals with occasional single dropped beats, consistent with 2nd degree heart block type 1 ([place]).  The average pulse rate was 58.7 bpm.  The minimum pulse rate was 27.2 bpm while the maximum pulse rate was 87.0 bpm. The rhythm is normal sinus.       Assessment:     Decreased sleep latency without sleep aid, increased arousal index, and normal sleep efficiency.    Obstructive sleep apnea and REM-related hypoventilation, with emergence of  central sleep apnea on higher Bilevel S pressures.      Given neuromuscular disorder (c4-5 quadriparesis) with daytime hypercapnia based on ABG and lack of COPD, patient was started on Bilevel ST with back-up rate of 10 bpm which improved NREM central apneas.  However, at this setting REM sleep was not seen, although suspicion for hypoventilation persists as issue was with REM tidal volumes rather than rate.      Tidal volumes during REM were 150 - 300 while during NREM they were closer to 450 - 500 mL.    Progressively lengthening CT intervals with occasional single dropped beats, consistent with 2nd degree heart block type 1 ([place]).    Recommendations:    Based on the above study, recommend AVAPS due to NREM pressure-emergent central sleep apnea, with significant REM-related hypoventilation:  1. EPAP 6 cmH2O should alleviate obstructive apneas  2. IPAP min 5 and IPAP max of 14  3.  mL  4. Back-up rate of 10 breaths per minute    Advise regarding the risks of drowsy driving.    Suggest optimizing sleep schedule and avoiding sleep deprivation.    Diagnostic Codes:    Obstructive Sleep Apnea G47.33    Sleep Hypoxemia/Hypoventilation G47.36     _____________________________________   Electronically Signed By: Kevin Vaughn MD 10/20/2017

## 2017-10-25 ENCOUNTER — OFFICE VISIT (OUTPATIENT)
Dept: SLEEP MEDICINE | Facility: CLINIC | Age: 62
End: 2017-10-25
Payer: COMMERCIAL

## 2017-10-25 VITALS
HEART RATE: 64 BPM | WEIGHT: 210 LBS | BODY MASS INDEX: 29.4 KG/M2 | HEIGHT: 71 IN | DIASTOLIC BLOOD PRESSURE: 67 MMHG | OXYGEN SATURATION: 97 % | SYSTOLIC BLOOD PRESSURE: 118 MMHG

## 2017-10-25 DIAGNOSIS — G70.9 SLEEP-RELATED HYPOVENTILATION DUE TO NEUROMUSCULAR DISORDER (H): ICD-10-CM

## 2017-10-25 DIAGNOSIS — G47.36 SLEEP-RELATED HYPOVENTILATION DUE TO NEUROMUSCULAR DISORDER (H): ICD-10-CM

## 2017-10-25 PROCEDURE — 99214 OFFICE O/P EST MOD 30 MIN: CPT | Performed by: INTERNAL MEDICINE

## 2017-10-25 NOTE — Clinical Note
Transfer from [facility].  New AVAPS order.  Needs home set up. Kevni Vaughn MD, Sleep Physician Oct 25, 2017

## 2017-10-25 NOTE — MR AVS SNAPSHOT
After Visit Summary   10/25/2017    Alexandre Velásquez    MRN: [id number]           Patient Information     Date Of Birth          1955        Visit Information        Provider Department      10/25/2017 12:00 PM Kevin Vaughn MD [facility]        Today's Diagnoses     Sleep-related hypoventilation due to neuromuscular disorder (H)          Care Instructions      Your BMI is Body mass index is 29.29 kg/(m^2).  Weight management is a personal decision.  If you are interested in exploring weight loss strategies, the following discussion covers the approaches that may be successful. Body mass index (BMI) is one way to tell whether you are at a healthy weight, overweight, or obese. It measures your weight in relation to your height.  A BMI of 18.5 to 24.9 is in the healthy range. A person with a BMI of 25 to 29.9 is considered overweight, and someone with a BMI of 30 or greater is considered obese. More than two-thirds of American adults are considered overweight or obese.  Being overweight or obese increases the risk for further weight gain. Excess weight may lead to heart disease and diabetes.  Creating and following plans for healthy eating and physical activity may help you improve your health.  Weight control is part of healthy lifestyle and includes exercise, emotional health, and healthy eating habits. Careful eating habits lifelong are the mainstay of weight control. Though there are significant health benefits from weight loss, long-term weight loss with diet alone may be very difficult to achieve- studies show long-term success with dietary management in less than 10% of people. Attaining a healthy weight may be especially difficult to achieve in those with severe obesity. In some cases, medications, devices and surgical management might be considered.  What can you do?  If you are overweight or obese and are interested in methods for weight loss, you should  discuss this with your provider.     Consider reducing daily calorie intake by 500 calories.     Keep a food journal.     Avoiding skipping meals, consider cutting portions instead.    Diet combined with exercise helps maintain muscle while optimizing fat loss. Strength training is particularly important for building and maintaining muscle mass. Exercise helps reduce stress, increase energy, and improves fitness. Increasing exercise without diet control, however, may not burn enough calories to loose weight.       Start walking three days a week 10-20 minutes at a time    Work towards walking thirty minutes five days a week     Eventually, increase the speed of your walking for 1-2 minutes at time    In addition, we recommend that you review healthy lifestyles and methods for weight loss available through the National Institutes of Health patient information sites:  http://win.niddk.nih.gov/publications/index.htm    And look into health and wellness programs that may be available through your health insurance provider, employer, local community center, or opal club.    Weight management plan: Patient was referred to their PCP to discuss a diet and exercise plan.              Follow-ups after your visit        Follow-up notes from your care team     Return in about 2 months (around 12/25/2017) for PAP Compliance Check.      Who to contact     If you have questions or need follow up information about today's clinic visit or your schedule please contact [facility] SLE[facility] directly at [phone].  Normal or non-critical lab and imaging results will be communicated to you by MyChart, letter or phone within 4 business days after the clinic has received the results. If you do not hear from us within 7 days, please contact the clinic through MyChart or phone. If you have a critical or abnormal lab result, we will notify you by phone as soon as possible.  Submit refill requests through [employer] or call your  "pharmacy and they will forward the refill request to us. Please allow 3 business days for your refill to be completed.          Additional Information About Your Visit        MyChart Information     [employer] lets you send messages to your doctor, view your test results, renew your prescriptions, schedule appointments and more. To sign up, go to [email or web address] . Click on \"Log in\" on the left side of the screen, which will take you to the Welcome page. Then click on \"Sign up Now\" on the right side of the page.     You will be asked to enter the access code listed below, as well as some personal information. Please follow the directions to create your username and password.     Your access code is: [email or web address]  Expires: 2018  9:49 AM     Your access code will  in 90 days. If you need help or a new code, please call your [place] clinic or [phone].        Care EveryWhere ID     This is your Care EveryWhere ID. This could be used by other organizations to access your [place] medical records  [id number]        Your Vitals Were     Pulse Height Pulse Oximetry BMI (Body Mass Index)          64 1.803 m (5' 11\") 97% 29.29 kg/m2         Blood Pressure from Last 3 Encounters:   10/25/17 118/67   10/03/17 128/66   10/03/17 (!) 87/59    Weight from Last 3 Encounters:   10/25/17 95.3 kg (210 lb)   10/03/17 95.3 kg (210 lb)   17 94.8 kg (209 lb)              Today, you had the following     No orders found for display       Primary Care Provider Office Phone # Fax #    Manny Seymour [phone]       [facility] [address] [place] [address]        Equal Access to Services     LUANNE MILLS : Savanna Staton, waamandeepda luqadaha, qaybta kaalmaeagle russell, kwame eaton. So [facility] [phone].    ATENCIÓN: Si habla español, tiene a mckeon disposición servicios gratuitos de asistencia lingüística. Llame al [phone].    We " comply with applicable federal civil rights laws and [place] laws. We do not discriminate on the basis of race, color, national origin, age, disability, sex, sexual orientation, or gender identity.            Thank you!     Thank you for choosing [facility]  for your care. Our goal is always to provide you with excellent care. Hearing back from our patients is one way we can continue to improve our services. Please take a few minutes to complete the written survey that you may receive in the mail after your visit with us. Thank you!             Your Updated Medication List - Protect others around you: Learn how to safely use, store and throw away your medicines at www.disposemymeds.org.          This list is accurate as of: 10/25/17 12:40 PM.  Always use your most recent med list.                   Brand Name Dispense Instructions for use Diagnosis    amoxicillin-clavulanate 875-125 MG per tablet    AUGMENTIN    20 tablet    Take 1 tablet by mouth 2 times daily    Left leg cellulitis       baclofen 20 MG tablet    LIORESAL    270 tablet    TAKE 1 TABLET (20 MG) BY MOUTH 4 TIMES DAILY AS NEEDED    Chronic pain syndrome, Quadriplegia (H)       CHOICE DM FIBER-BURST OR      Take  by mouth daily. Am and pm    Diabetes mellitus, type 2 (H), Quadriplegia (H), Osteomyelitis (H), Spasms       COENZYME Q-10 PO      Take 100 mg by mouth 2 times daily.        DULoxetine 60 MG EC capsule    CYMBALTA    90 capsule    TAKE 1 CAPSULE (60 MG) BY MOUTH DAILY    Decubitus ulcer of left ischium, stage 4 (H)       FLORASTOR 250 MG capsule   Generic drug:  saccharomyces boulardii      Take 250 mg by mouth daily        furosemide 20 MG tablet    LASIX    30 tablet    Take 1 tablet (20 mg) by mouth daily as needed    Diastolic dysfunction, SOB (shortness of breath)       glipiZIDE XL 5 MG 24 hr tablet   Generic drug:  glipiZIDE     90 tablet    TAKE 1 TABLET (5 MG) BY MOUTH DAILY    Type 2 diabetes mellitus with other  diabetic kidney complication       MIRALAX PO      Take by mouth 2 times daily        Multiple vitamin Tabs      1 TABLET DAILY        order for DME     30 days    Equipment being ordered:[place] Fax [phone] Reference Number# [id number] Primary Dressing Drawtex 3x39 roll Qty 5 rolls Length of Need: 1 month Frequency of dressing change: daily    Pressure ulcer of trochanteric region of left hip, stage 4 (H)       PROAIR  (90 BASE) MCG/ACT Inhaler   Generic drug:  albuterol     1 g    INHALE 2 PUFFS INTO THE LUNGS EVERY 6 HOURS AS NEEDED FOR SHORTNESS OF BREATH / DYSPNEA OR WHEEZING    SOB (shortness of breath)       probiotic Caps      daily        sennosides 8.6 MG tablet    SENOKOT    120 each    Take 2 tablets by mouth 2 times daily    Orthopnea       PILAR FOR MEN Misc     120 Bottle    1 pad as needed.    Quadriplegia (H)       TYLENOL CAPS 500 MG OR     60 Cap    2 CAPSULE EVERY 6 HOURS AS NEEDED (taking 4 x daily as needed)

## 2017-10-25 NOTE — NURSING NOTE
"Chief Complaint   Patient presents with     Study Results       Initial There were no vitals taken for this visit. Estimated body mass index is 29.29 kg/(m^2) as calculated from the following:    Height as of 10/3/17: 1.803 m (5' 11\").    Weight as of 10/3/17: 95.3 kg (210 lb).  Medication Reconciliation: complete    "
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
## 2017-10-31 LAB
HCO3 BLD-SCNC: NORMAL MMOL/L (ref 21–28)
PCO2 BLD: NORMAL MM HG (ref 35–45)
PH BLD: NORMAL PH (ref 7.35–7.45)
PO2 BLD: NORMAL MM HG (ref 80–105)
SAO2 % BLDA FROM PO2: NORMAL % (ref 92–100)

## 2017-11-08 ENCOUNTER — TRANSFERRED RECORDS (OUTPATIENT)
Dept: HEALTH INFORMATION MANAGEMENT | Facility: CLINIC | Age: 62
End: 2017-11-08

## 2017-12-14 ENCOUNTER — DOCUMENTATION ONLY (OUTPATIENT)
Dept: SLEEP MEDICINE | Facility: CLINIC | Age: 62
End: 2017-12-14

## 2017-12-14 DIAGNOSIS — G47.36 SLEEP-RELATED HYPOVENTILATION DUE TO NEUROMUSCULAR DISORDER (H): ICD-10-CM

## 2017-12-14 DIAGNOSIS — G70.9 SLEEP-RELATED HYPOVENTILATION DUE TO NEUROMUSCULAR DISORDER (H): ICD-10-CM

## 2017-12-15 NOTE — PROGRESS NOTES
Patient was offered choice of vendor and chose [facility].  Patient Alexandre Velásquez was set up at in his home on December 14, 2017. Patient received a [employer] DreamStation AVAPS. Pressures were set at AVAPS EPAP 4, IPAP 6-14, Rate 10, Rise 3, Vt 450 (will need clarification on settings).   Patient s ramp is 4 cm H2O for 10 min.  Patient received a heated tubing and heated humidifier.  Patient is enrolled in the STM Program and does need to meet compliance. Patient has a follow up on TBD with Dr. Vaughn.    Alondra Ballard

## 2017-12-18 ENCOUNTER — DOCUMENTATION ONLY (OUTPATIENT)
Dept: SLEEP MEDICINE | Facility: CLINIC | Age: 62
End: 2017-12-18

## 2017-12-18 NOTE — PROGRESS NOTES
3 DAY STM VISIT    Patient contacted for 3 day STM visit  Message left for patient to return call     Device type: AVAPS/IVAPS  PAP settings:   EPAP Fixed 6    IPAP Min 11    IPAP Max 20    Rate 10    Rise 3    Vt 450         Assessment: Nightly usage over four hours.  Action plan: Pt to have f/u 14 day STM visit.

## 2017-12-18 NOTE — PROGRESS NOTES
Patient returned call.     Subjective measures: Pt states things are going well and has no issues or complaints.  Pt is benefiting from therapy. Patient meeting subjective benchmarks.     Action plan:pt to have 14 day [facility] visit.

## 2017-12-29 ENCOUNTER — DOCUMENTATION ONLY (OUTPATIENT)
Dept: SLEEP MEDICINE | Facility: CLINIC | Age: 62
End: 2017-12-29

## 2017-12-29 DIAGNOSIS — G70.9 SLEEP-RELATED HYPOVENTILATION DUE TO NEUROMUSCULAR DISORDER (H): ICD-10-CM

## 2017-12-29 DIAGNOSIS — G47.36 SLEEP-RELATED HYPOVENTILATION DUE TO NEUROMUSCULAR DISORDER (H): ICD-10-CM

## 2017-12-29 NOTE — PROGRESS NOTES
14 DAY STM VISIT    Message left for patient to return call     Assessment: Pt meeting objective benchmarks.     Action plan: waiting for patient to return call.  and pt to have 30 day STM visit.   Device type: AVAPS/IVAPS  PAP settings:   EPAP Fixed 6    IPAP Min 11    IPAP Max 20    Rate 10    Rise 3    Vt 450        Objective measures: 14 day rolling measures         Compliance  92 %      % of night spent in large leak  0 % last  upload      AHI 3.45   last  upload      Average number of minutes 484        Objective measure goal  Compliance   Goal >70%  Leak   Goal < 10%  AHI  Goal < 5  Usage  Goal >240

## 2018-01-02 ENCOUNTER — OFFICE VISIT (OUTPATIENT)
Dept: SLEEP MEDICINE | Facility: CLINIC | Age: 63
End: 2018-01-02
Payer: COMMERCIAL

## 2018-01-02 VITALS
OXYGEN SATURATION: 93 % | WEIGHT: 210 LBS | DIASTOLIC BLOOD PRESSURE: 79 MMHG | HEART RATE: 92 BPM | SYSTOLIC BLOOD PRESSURE: 122 MMHG | HEIGHT: 71 IN | BODY MASS INDEX: 29.4 KG/M2

## 2018-01-02 DIAGNOSIS — G70.9 SLEEP-RELATED HYPOVENTILATION DUE TO NEUROMUSCULAR DISORDER (H): ICD-10-CM

## 2018-01-02 DIAGNOSIS — G47.36 SLEEP-RELATED HYPOVENTILATION DUE TO NEUROMUSCULAR DISORDER (H): ICD-10-CM

## 2018-01-02 PROCEDURE — 99214 OFFICE O/P EST MOD 30 MIN: CPT | Performed by: INTERNAL MEDICINE

## 2018-01-02 NOTE — MR AVS SNAPSHOT
After Visit Summary   1/2/2018    Alexandre Velásquez    MRN: [id number]           Patient Information     Date Of Birth          1955        Visit Information        Provider Department      1/2/2018 10:30 AM Kevin Vaughn MD [facility]        Today's Diagnoses     Sleep-related hypoventilation due to neuromuscular disorder (H)          Care Instructions      Your BMI is Body mass index is 29.71 kg/(m^2).  Weight management is a personal decision.  If you are interested in exploring weight loss strategies, the following discussion covers the approaches that may be successful. Body mass index (BMI) is one way to tell whether you are at a healthy weight, overweight, or obese. It measures your weight in relation to your height.  A BMI of 18.5 to 24.9 is in the healthy range. A person with a BMI of 25 to 29.9 is considered overweight, and someone with a BMI of 30 or greater is considered obese. More than two-thirds of American adults are considered overweight or obese.  Being overweight or obese increases the risk for further weight gain. Excess weight may lead to heart disease and diabetes.  Creating and following plans for healthy eating and physical activity may help you improve your health.  Weight control is part of healthy lifestyle and includes exercise, emotional health, and healthy eating habits. Careful eating habits lifelong are the mainstay of weight control. Though there are significant health benefits from weight loss, long-term weight loss with diet alone may be very difficult to achieve- studies show long-term success with dietary management in less than 10% of people. Attaining a healthy weight may be especially difficult to achieve in those with severe obesity. In some cases, medications, devices and surgical management might be considered.  What can you do?  If you are overweight or obese and are interested in methods for weight loss, you should discuss  this with your provider.     Consider reducing daily calorie intake by 500 calories.     Keep a food journal.     Avoiding skipping meals, consider cutting portions instead.    Diet combined with exercise helps maintain muscle while optimizing fat loss. Strength training is particularly important for building and maintaining muscle mass. Exercise helps reduce stress, increase energy, and improves fitness. Increasing exercise without diet control, however, may not burn enough calories to loose weight.       Start walking three days a week 10-20 minutes at a time    Work towards walking thirty minutes five days a week     Eventually, increase the speed of your walking for 1-2 minutes at time    In addition, we recommend that you review healthy lifestyles and methods for weight loss available through the National Institutes of Health patient information sites:  http://win.niddk.nih.gov/publications/index.htm    And look into health and wellness programs that may be available through your health insurance provider, employer, local community center, or opal club.    Weight management plan: Patient was referred to their PCP to discuss a diet and exercise plan.              Follow-ups after your visit        Follow-up notes from your care team     Return in about 7 weeks (around 2/21/2018) for PAP Compliance Check.      Your next 10 appointments already scheduled     Feb 20, 2018 10:30 AM CST   Return Sleep Patient with Kevin Vaughn MD   [facility] [place]    [address] [place] [id number] [phone]              Who to contact     If you have questions or need follow up information about today's clinic visit or your schedule please contact [facility] directly at [phone].  Normal or non-critical lab and imaging results will be communicated to you by MyChart, letter or phone within 4 business days after the  "clinic has received the results. If you do not hear from us within 7 days, please contact the clinic through [employer] or phone. If you have a critical or abnormal lab result, we will notify you by phone as soon as possible.  Submit refill requests through [employer] or call your pharmacy and they will forward the refill request to us. Please allow 3 business days for your refill to be completed.          Additional Information About Your Visit        [employer] Information     [employer] lets you send messages to your doctor, view your test results, renew your prescriptions, schedule appointments and more. To sign up, go to [email or web address] . Click on \"Log in\" on the left side of the screen, which will take you to the Welcome page. Then click on \"Sign up Now\" on the right side of the page.     You will be asked to enter the access code listed below, as well as some personal information. Please follow the directions to create your username and password.     Your access code is: [email or web address]  Expires: 2018 11:17 AM     Your access code will  in 90 days. If you need help or a new code, please call your [place] clinic or [phone].        Care EveryWhere ID     This is your Care EveryWhere ID. This could be used by other organizations to access your [place] medical records  [id number]        Your Vitals Were     Pulse Height Pulse Oximetry BMI (Body Mass Index)          92 1.791 m (5' 10.5\") 93% 29.71 kg/m2         Blood Pressure from Last 3 Encounters:   18 122/79   10/25/17 118/67   10/03/17 128/66    Weight from Last 3 Encounters:   18 95.3 kg (210 lb)   10/25/17 95.3 kg (210 lb)   10/03/17 95.3 kg (210 lb)              Today, you had the following     No orders found for display       Primary Care Provider Office Phone # Fax #    Manny Seymour [phone]       [place] [id number] [place] [address]        Equal Access to Services     SHIRA MILLS AH: " Hadii sergio locoo Sopattiali, waaxda luqadaha, qaybta kaalmada yvonne, kwame aquilinoin hayaalorenzo darnellevens ramirez ladeniselorenzo angelito. So [facility] [phone].    ATENCIÓN: Si willa funk, tiene a mckeon disposición servicios gratuitos de asistencia lingüística. Llame al [phone].    We comply with applicable federal civil rights laws and [place] laws. We do not discriminate on the basis of race, color, national origin, age, disability, sex, sexual orientation, or gender identity.            Thank you!     Thank you for choosing [facility]  for your care. Our goal is always to provide you with excellent care. Hearing back from our patients is one way we can continue to improve our services. Please take a few minutes to complete the written survey that you may receive in the mail after your visit with us. Thank you!             Your Updated Medication List - Protect others around you: Learn how to safely use, store and throw away your medicines at www.disposemymeds.org.          This list is accurate as of: 1/2/18 11:20 AM.  Always use your most recent med list.                   Brand Name Dispense Instructions for use Diagnosis    baclofen 20 MG tablet    LIORESAL    270 tablet    TAKE 1 TABLET (20 MG) BY MOUTH 4 TIMES DAILY AS NEEDED    Chronic pain syndrome, Quadriplegia (H)       CHOICE DM FIBER-BURST OR      Take  by mouth daily. Am and pm    Diabetes mellitus, type 2 (H), Quadriplegia (H), Osteomyelitis (H), Spasms       COENZYME Q-10 PO      Take 100 mg by mouth 2 times daily.        DULoxetine 60 MG EC capsule    CYMBALTA    90 capsule    TAKE 1 CAPSULE (60 MG) BY MOUTH DAILY    Decubitus ulcer of left ischium, stage 4 (H)       FLORASTOR 250 MG capsule   Generic drug:  saccharomyces boulardii      Take 250 mg by mouth daily        furosemide 20 MG tablet    LASIX    30 tablet    Take 1 tablet (20 mg) by mouth daily as needed    Diastolic dysfunction, SOB (shortness of breath)       glipiZIDE XL 5 MG 24 hr  tablet   Generic drug:  glipiZIDE     90 tablet    TAKE 1 TABLET (5 MG) BY MOUTH DAILY    Type 2 diabetes mellitus with other diabetic kidney complication       MIRALAX PO      Take by mouth 2 times daily        Multiple vitamin Tabs      1 TABLET DAILY        order for DME     30 days    Equipment being ordered:[facility] Fax [phone] Reference Number# [id number] Primary Dressing Drawtex 3x39 roll Qty 5 rolls Length of Need: 1 month Frequency of dressing change: daily    Pressure ulcer of trochanteric region of left hip, stage 4 (H)       PROAIR  (90 BASE) MCG/ACT Inhaler   Generic drug:  albuterol     1 g    INHALE 2 PUFFS INTO THE LUNGS EVERY 6 HOURS AS NEEDED FOR SHORTNESS OF BREATH / DYSPNEA OR WHEEZING    SOB (shortness of breath)       probiotic Caps      daily        sennosides 8.6 MG tablet    SENOKOT    120 each    Take 2 tablets by mouth 2 times daily    Orthopnea       PILAR FOR MEN Misc     120 Bottle    1 pad as needed.    Quadriplegia (H)       TYLENOL CAPS 500 MG OR     60 Cap    2 CAPSULE EVERY 6 HOURS AS NEEDED (taking 4 x daily as needed)

## 2018-01-02 NOTE — NURSING NOTE
"Chief Complaint   Patient presents with     CPAP Follow Up       Initial There were no vitals taken for this visit. Estimated body mass index is 29.29 kg/(m^2) as calculated from the following:    Height as of 10/25/17: 1.803 m (5' 11\").    Weight as of 10/25/17: 95.3 kg (210 lb).  Medication Reconciliation: complete    "
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
## 2018-01-02 NOTE — PATIENT INSTRUCTIONS

## 2018-01-16 ENCOUNTER — DOCUMENTATION ONLY (OUTPATIENT)
Dept: SLEEP MEDICINE | Facility: CLINIC | Age: 63
End: 2018-01-16

## 2018-01-16 DIAGNOSIS — G47.36 SLEEP-RELATED HYPOVENTILATION DUE TO NEUROMUSCULAR DISORDER (H): ICD-10-CM

## 2018-01-16 DIAGNOSIS — G70.9 SLEEP-RELATED HYPOVENTILATION DUE TO NEUROMUSCULAR DISORDER (H): ICD-10-CM

## 2018-01-16 NOTE — Clinical Note
Hi 30 day [facility] FYI.  Danny continues to have a slightly elevated AHI consisting of both central and obstructive events.  His follow up with you is a little way out. Subjectively he is doing well.  Let me know if you want to make any changes.  Shaista

## 2018-01-16 NOTE — PROGRESS NOTES
30 DAY [facility] VISIT    Subjective measures:  Pt feeling benefit from therapy.  No issues with pressures or mask fit.   Few nights he is running out of water in his machine.      Assessment: Pt not meeting objective benchmarks for AHI    Action plan: .   Patient has a follow up visit with Dr. Vaughn on 2/20/2018 Pt to watch mask fit for loss of humidity   Device type: AVAPS/IVAPS  PAP settings:  EPAP Fixed 6    IPAP Min 11    IPAP Max 20    Rate 10    Rise 3    Vt 450            Objective measures: 14 day rolling measures         Compliance  100 %     % of night spent in large leak  1 % last  upload      AHI 11.97   last  Upload central and obstructive events.       Average number of minutes 501            Objective measure goal  Compliance   Goal >70%  Leak   Goal < 10%  AHI  Goal < 5  Usage  Goal >240

## 2018-01-25 DIAGNOSIS — N18.30 TYPE 2 DIABETES MELLITUS WITH STAGE 3 CHRONIC KIDNEY DISEASE, WITHOUT LONG-TERM CURRENT USE OF INSULIN (H): Primary | ICD-10-CM

## 2018-01-25 DIAGNOSIS — E11.22 TYPE 2 DIABETES MELLITUS WITH STAGE 3 CHRONIC KIDNEY DISEASE, WITHOUT LONG-TERM CURRENT USE OF INSULIN (H): Primary | ICD-10-CM

## 2018-01-25 NOTE — TELEPHONE ENCOUNTER
"Last Written Prescription Date:  10/03/2017  Last Fill Quantity: 90 ,  # refills: 3   Last Office Visit with [facility] provider:  10/03/2017   Future Office Visit:    Next 5 appointments (look out 90 days)     Jan 30, 2018  2:45 PM CST   Pre-Op physical with M[facility]ymour [facility]   [facility]    [address] [place] [id number] [phone]                 Requested Prescriptions   Pending Prescriptions Disp Refills     GLIPIZIDE XL 5 MG 24 hr tablet [Pharmacy Med Name: GLIPIZIDE XL 5 MG TAB ER 24] 90 tablet      Sig: TAKE 1 TABLET (5 MG) BY MOUTH DAILY    Sulfonylurea Agents Failed    1/25/2018 12:35 PM       Failed - Patient has documented LDL within the past 12 mos.    Recent Labs   Lab Test  09/09/16   0941   LDL  92            Failed - Patient has had a Microalbumin in the past 12 mos.    Recent Labs   Lab Test  05/05/14   1232   MICROL  669   UMALCR  1153.45*            Failed - Patient has documented A1c within the specified period of time.    Recent Labs   Lab Test  03/21/17   0951   A1C  6.6*            Passed - Patient's BP is less than 140/90    BP Readings from Last 3 Encounters:   01/02/18 122/79   10/25/17 118/67   10/03/17 128/66                Passed - Patient is age 18 or older       Passed - Patient has a recent creatinine (normal) within the past 12 mos.    Recent Labs   Lab Test  06/28/17   0937   CR  0.66            Passed - Patient has had an appointment with authorizing provider within the past 6 mos. or  within next 30 days    Patient had office visit in the last 6 months or has a visit in the next 30 days with authorizing provider.  See \"Patient Info\" tab in inbasket, or \"Choose Columns\" in Meds & Orders section of the refill encounter.              "

## 2018-01-25 NOTE — TELEPHONE ENCOUNTER
Routing GLIPIZIDE refill request to provider for review/approval because:  Labs not current:  Overdue for LIPID, Hgb A1C, Micro-albumin, TSH and BMP.. FUTURE orders placed.  A break in medication. Last Filled on 12/16/16 for one year.  GUSTABO Gentile

## 2018-01-26 PROBLEM — E11.22 TYPE 2 DIABETES MELLITUS WITH STAGE 3 CHRONIC KIDNEY DISEASE, WITHOUT LONG-TERM CURRENT USE OF INSULIN (H): Status: ACTIVE | Noted: 2018-01-26

## 2018-01-26 PROBLEM — N18.30 TYPE 2 DIABETES MELLITUS WITH STAGE 3 CHRONIC KIDNEY DISEASE, WITHOUT LONG-TERM CURRENT USE OF INSULIN (H): Status: ACTIVE | Noted: 2018-01-26

## 2018-01-26 RX ORDER — GLIPIZIDE 5 MG/1
TABLET, EXTENDED RELEASE ORAL
Qty: 90 TABLET | Refills: 0 | Status: SHIPPED | OUTPATIENT
Start: 2018-01-26 | End: 2018-05-02

## 2018-01-30 ENCOUNTER — OFFICE VISIT (OUTPATIENT)
Dept: INTERNAL MEDICINE | Facility: CLINIC | Age: 63
End: 2018-01-30
Payer: COMMERCIAL

## 2018-01-30 VITALS
RESPIRATION RATE: 16 BRPM | HEART RATE: 82 BPM | SYSTOLIC BLOOD PRESSURE: 132 MMHG | DIASTOLIC BLOOD PRESSURE: 78 MMHG | TEMPERATURE: 97.7 F

## 2018-01-30 DIAGNOSIS — N18.30 TYPE 2 DIABETES MELLITUS WITH STAGE 3 CHRONIC KIDNEY DISEASE, WITHOUT LONG-TERM CURRENT USE OF INSULIN (H): ICD-10-CM

## 2018-01-30 DIAGNOSIS — G47.33 OSA (OBSTRUCTIVE SLEEP APNEA): ICD-10-CM

## 2018-01-30 DIAGNOSIS — E11.22 TYPE 2 DIABETES MELLITUS WITH STAGE 3 CHRONIC KIDNEY DISEASE, WITHOUT LONG-TERM CURRENT USE OF INSULIN (H): ICD-10-CM

## 2018-01-30 DIAGNOSIS — Z01.818 PREOP GENERAL PHYSICAL EXAM: Primary | ICD-10-CM

## 2018-01-30 DIAGNOSIS — G82.50 QUADRIPLEGIA (H): ICD-10-CM

## 2018-01-30 DIAGNOSIS — D50.8 OTHER IRON DEFICIENCY ANEMIA: ICD-10-CM

## 2018-01-30 DIAGNOSIS — N21.0 BLADDER STONES: ICD-10-CM

## 2018-01-30 LAB
ANION GAP SERPL CALCULATED.3IONS-SCNC: 9 MMOL/L (ref 3–14)
BUN SERPL-MCNC: 27 MG/DL (ref 7–30)
CALCIUM SERPL-MCNC: 8.7 MG/DL (ref 8.5–10.1)
CHLORIDE SERPL-SCNC: 101 MMOL/L (ref 94–109)
CO2 SERPL-SCNC: 27 MMOL/L (ref 20–32)
CREAT SERPL-MCNC: 0.55 MG/DL (ref 0.66–1.25)
ERYTHROCYTE [DISTWIDTH] IN BLOOD BY AUTOMATED COUNT: 14.6 % (ref 10–15)
GFR SERPL CREATININE-BSD FRML MDRD: >90 ML/MIN/1.7M2
GLUCOSE SERPL-MCNC: 183 MG/DL (ref 70–99)
HBA1C MFR BLD: 6.8 % (ref 4.3–6)
HCT VFR BLD AUTO: 43.7 % (ref 40–53)
HGB BLD-MCNC: 13.9 G/DL (ref 13.3–17.7)
MCH RBC QN AUTO: 30.3 PG (ref 26.5–33)
MCHC RBC AUTO-ENTMCNC: 31.8 G/DL (ref 31.5–36.5)
MCV RBC AUTO: 95 FL (ref 78–100)
PLATELET # BLD AUTO: 228 10E9/L (ref 150–450)
POTASSIUM SERPL-SCNC: 4.2 MMOL/L (ref 3.4–5.3)
RBC # BLD AUTO: 4.59 10E12/L (ref 4.4–5.9)
SODIUM SERPL-SCNC: 137 MMOL/L (ref 133–144)
TSH SERPL DL<=0.005 MIU/L-ACNC: 1.51 MU/L (ref 0.4–4)
WBC # BLD AUTO: 6.8 10E9/L (ref 4–11)

## 2018-01-30 PROCEDURE — 36415 COLL VENOUS BLD VENIPUNCTURE: CPT | Performed by: INTERNAL MEDICINE

## 2018-01-30 PROCEDURE — 99214 OFFICE O/P EST MOD 30 MIN: CPT | Performed by: INTERNAL MEDICINE

## 2018-01-30 PROCEDURE — 80048 BASIC METABOLIC PNL TOTAL CA: CPT | Performed by: INTERNAL MEDICINE

## 2018-01-30 PROCEDURE — 83036 HEMOGLOBIN GLYCOSYLATED A1C: CPT | Performed by: INTERNAL MEDICINE

## 2018-01-30 PROCEDURE — 93000 ELECTROCARDIOGRAM COMPLETE: CPT | Performed by: INTERNAL MEDICINE

## 2018-01-30 PROCEDURE — 84443 ASSAY THYROID STIM HORMONE: CPT | Performed by: INTERNAL MEDICINE

## 2018-01-30 PROCEDURE — 85027 COMPLETE CBC AUTOMATED: CPT | Performed by: INTERNAL MEDICINE

## 2018-01-30 ASSESSMENT — PAIN SCALES - GENERAL: PAINLEVEL: EXTREME PAIN (8)

## 2018-01-30 NOTE — NURSING NOTE
"Chief Complaint   Patient presents with     Pre-Op Exam       Initial /78  Pulse 82  Temp 97.7  F (36.5  C) (Temporal)  Resp 16 Estimated body mass index is 29.71 kg/(m^2) as calculated from the following:    Height as of 1/2/18: 5' 10.5\" (1.791 m).    Weight as of 1/2/18: 210 lb (95.3 kg).  Medication Reconciliation: complete    "

## 2018-01-30 NOTE — MR AVS SNAPSHOT
After Visit Summary   1/30/2018    Alexandre Velásquez    MRN: [id number]           Patient Information     Date Of Birth          1955        Visit Information        Provider Department      1/30/2018 2:45 PM Manyn Seymour MD [facility]        Today's Diagnoses     Preop general physical exam    -  1    Type 2 diabetes mellitus with stage 3 chronic kidney disease, without long-term current use of insulin (H)        Other iron deficiency anemia        GURJIT (obstructive sleep apnea)        Quadriplegia (H)        Bladder stones          Care Instructions      Before Your Surgery      Call your surgeon if there is any change in your health. This includes signs of a cold or flu (such as a sore throat, runny nose, cough, rash or fever).    Do not smoke, drink alcohol or take over the counter medicine (unless your surgeon or primary care doctor tells you to) for the 24 hours before and after surgery.    If you take prescribed drugs: Follow your doctor s orders about which medicines to take and which to stop until after surgery.    Eating and drinking prior to surgery: follow the instructions from your surgeon    Take a shower or bath the night before surgery. Use the soap your surgeon gave you to gently clean your skin. If you do not have soap from your surgeon, use your regular soap. Do not shave or scrub the surgery site.  Wear clean pajamas and have clean sheets on your bed.           Follow-ups after your visit        Your next 10 appointments already scheduled     Feb 20, 2018 10:30 AM CST   Return Sleep Patient with Kevin Vaughn MD   [facility] ([facility])    [address] [place] [id number] [phone]              Who to contact     If you have questions or need follow up information about today's clinic visit or your schedule please contact [facility] directly at  "[phone].  Normal or non-critical lab and imaging results will be communicated to you by [employer], letter or phone within 4 business days after the clinic has received the results. If you do not hear from us within 7 days, please contact the clinic through [employer] or phone. If you have a critical or abnormal lab result, we will notify you by phone as soon as possible.  Submit refill requests through [employer] or call your pharmacy and they will forward the refill request to us. Please allow 3 business days for your refill to be completed.          Additional Information About Your Visit        [employer] Information     [employer] lets you send messages to your doctor, view your test results, renew your prescriptions, schedule appointments and more. To sign up, go to [email or web address] . Click on \"Log in\" on the left side of the screen, which will take you to the Welcome page. Then click on \"Sign up Now\" on the right side of the page.     You will be asked to enter the access code listed below, as well as some personal information. Please follow the directions to create your username and password.     Your access code is: [email or web address]  Expires: 2018 11:17 AM     Your access code will  in 90 days. If you need help or a new code, please call your [place] [facility] clinic or [phone].        Care EveryWhere ID     This is your Care EveryWhere ID. This could be used by other organizations to access your [facility] medical records  [id number]        Your Vitals Were     Pulse Temperature Respirations             82 97.7  F (36.5  C) (Temporal) 16          Blood Pressure from Last 3 Encounters:   18 132/78   18 122/79   10/25/17 118/67    Weight from Last 3 Encounters:   18 210 lb (95.3 kg)   10/25/17 210 lb (95.3 kg)   10/03/17 210 lb (95.3 kg)              We Performed the Following     Albumin Random Urine Quantitative with Creat Ratio     Basic metabolic panel     CBC with platelets     EKG " 12-lead complete w/read - Clinics     Hemoglobin A1c     TSH        Primary Care Provider Office Phone # Fax #    Manny Seymour [phone]       [facility] [address] [place] [address]        Equal Access to Services     SHIRA MILLS : Hadii sergio ku hadtriciao Soomaali, waaxda luqadaha, qaybta kaalmada adeegyada, waxay idiin hayrossyn carieevens ramirez mak eaton. So [facility] [phone].    ATENCIÓN: Si habla español, tiene a mckeon disposición servicios gratuitos de asistencia lingüística. Llame al [phone].    We comply with applicable federal civil rights laws and [place] laws. We do not discriminate on the basis of race, color, national origin, age, disability, sex, sexual orientation, or gender identity.            Thank you!     Thank you for choosing [facility]  for your care. Our goal is always to provide you with excellent care. Hearing back from our patients is one way we can continue to improve our services. Please take a few minutes to complete the written survey that you may receive in the mail after your visit with us. Thank you!             Your Updated Medication List - Protect others around you: Learn how to safely use, store and throw away your medicines at www.disposemymeds.org.          This list is accurate as of 1/30/18  3:27 PM.  Always use your most recent med list.                   Brand Name Dispense Instructions for use Diagnosis    baclofen 20 MG tablet    LIORESAL    270 tablet    TAKE 1 TABLET (20 MG) BY MOUTH 4 TIMES DAILY AS NEEDED    Chronic pain syndrome, Quadriplegia (H)       CHOICE DM FIBER-BURST OR      Take  by mouth daily. Am and pm    Diabetes mellitus, type 2 (H), Quadriplegia (H), Osteomyelitis (H), Spasms       COENZYME Q-10 PO      Take 100 mg by mouth 2 times daily.        DULoxetine 60 MG EC capsule    CYMBALTA    90 capsule    TAKE 1 CAPSULE (60 MG) BY MOUTH DAILY    Decubitus ulcer of left ischium, stage 4 (H)        FLORASTOR 250 MG capsule   Generic drug:  saccharomyces boulardii      Take 250 mg by mouth daily        furosemide 20 MG tablet    LASIX    30 tablet    Take 1 tablet (20 mg) by mouth daily as needed    Diastolic dysfunction, SOB (shortness of breath)       glipiZIDE XL 5 MG 24 hr tablet   Generic drug:  glipiZIDE     90 tablet    TAKE 1 TABLET (5 MG) BY MOUTH DAILY    Type 2 diabetes mellitus with stage 3 chronic kidney disease, without long-term current use of insulin (H)       MIRALAX PO      Take by mouth 2 times daily        Multiple vitamin Tabs      1 TABLET DAILY        order for DME     30 days    Equipment being ordered:[facility] Fax [phone] Reference Number# [id number] Primary Dressing Drawtex 3x39 roll Qty 5 rolls Length of Need: 1 month Frequency of dressing change: daily    Pressure ulcer of trochanteric region of left hip, stage 4 (H)       PROAIR  (90 BASE) MCG/ACT Inhaler   Generic drug:  albuterol     1 g    INHALE 2 PUFFS INTO THE LUNGS EVERY 6 HOURS AS NEEDED FOR SHORTNESS OF BREATH / DYSPNEA OR WHEEZING    SOB (shortness of breath)       probiotic Caps      daily        PILAR FOR MEN Misc     120 Bottle    1 pad as needed.    Quadriplegia (H)       TYLENOL CAPS 500 MG OR     60 Cap    2 CAPSULE EVERY 6 HOURS AS NEEDED (taking 4 x daily as needed)
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
## 2018-01-31 ENCOUNTER — TRANSFERRED RECORDS (OUTPATIENT)
Dept: HEALTH INFORMATION MANAGEMENT | Facility: CLINIC | Age: 63
End: 2018-01-31

## 2018-02-13 DIAGNOSIS — G82.50 QUADRIPLEGIA (H): ICD-10-CM

## 2018-02-13 DIAGNOSIS — G89.4 CHRONIC PAIN SYNDROME: ICD-10-CM

## 2018-02-13 RX ORDER — BACLOFEN 20 MG/1
TABLET ORAL
Qty: 270 TABLET | Refills: 0 | Status: SHIPPED | OUTPATIENT
Start: 2018-02-13 | End: 2018-09-19

## 2018-02-13 NOTE — TELEPHONE ENCOUNTER
Baclofen 20 MG       Last Written Prescription Date:  4/21/17  Last Fill Quantity: 270,   # refills: 1  Last Office Visit: 1/30/18  Future Office visit:       Routing refill request to provider for review/approval because:  Drug not on the G, P or [facility] refill protocol or controlled substance

## 2018-04-24 ENCOUNTER — OFFICE VISIT (OUTPATIENT)
Dept: SLEEP MEDICINE | Facility: CLINIC | Age: 63
End: 2018-04-24
Payer: COMMERCIAL

## 2018-04-24 VITALS
BODY MASS INDEX: 29.4 KG/M2 | OXYGEN SATURATION: 91 % | WEIGHT: 210 LBS | HEART RATE: 76 BPM | HEIGHT: 71 IN | SYSTOLIC BLOOD PRESSURE: 107 MMHG | DIASTOLIC BLOOD PRESSURE: 64 MMHG

## 2018-04-24 DIAGNOSIS — G70.9 SLEEP-RELATED HYPOVENTILATION DUE TO NEUROMUSCULAR DISORDER (H): ICD-10-CM

## 2018-04-24 DIAGNOSIS — G47.36 SLEEP-RELATED HYPOVENTILATION DUE TO NEUROMUSCULAR DISORDER (H): ICD-10-CM

## 2018-04-24 PROCEDURE — 99214 OFFICE O/P EST MOD 30 MIN: CPT | Performed by: INTERNAL MEDICINE

## 2018-04-24 NOTE — MR AVS SNAPSHOT
After Visit Summary   4/24/2018    Alexandre Velásquez    MRN: [id number]           Patient Information     Date Of Birth          1955        Visit Information        Provider Department      4/24/2018 10:30 AM Kevin Vaughn MD [facility]        Today's Diagnoses     Sleep-related hypoventilation due to neuromuscular disorder (H)          Care Instructions      Your BMI is Body mass index is 29.29 kg/(m^2).  Weight management is a personal decision.  If you are interested in exploring weight loss strategies, the following discussion covers the approaches that may be successful. Body mass index (BMI) is one way to tell whether you are at a healthy weight, overweight, or obese. It measures your weight in relation to your height.  A BMI of 18.5 to 24.9 is in the healthy range. A person with a BMI of 25 to 29.9 is considered overweight, and someone with a BMI of 30 or greater is considered obese. More than two-thirds of American adults are considered overweight or obese.  Being overweight or obese increases the risk for further weight gain. Excess weight may lead to heart disease and diabetes.  Creating and following plans for healthy eating and physical activity may help you improve your health.  Weight control is part of healthy lifestyle and includes exercise, emotional health, and healthy eating habits. Careful eating habits lifelong are the mainstay of weight control. Though there are significant health benefits from weight loss, long-term weight loss with diet alone may be very difficult to achieve- studies show long-term success with dietary management in less than 10% of people. Attaining a healthy weight may be especially difficult to achieve in those with severe obesity. In some cases, medications, devices and surgical management might be considered.  What can you do?  If you are overweight or obese and are interested in methods for weight loss, you should  discuss this with your provider.     Consider reducing daily calorie intake by 500 calories.     Keep a food journal.     Avoiding skipping meals, consider cutting portions instead.    Diet combined with exercise helps maintain muscle while optimizing fat loss. Strength training is particularly important for building and maintaining muscle mass. Exercise helps reduce stress, increase energy, and improves fitness. Increasing exercise without diet control, however, may not burn enough calories to loose weight.       Start walking three days a week 10-20 minutes at a time    Work towards walking thirty minutes five days a week     Eventually, increase the speed of your walking for 1-2 minutes at time    In addition, we recommend that you review healthy lifestyles and methods for weight loss available through the National Institutes of Health patient information sites:  http://win.niddk.nih.gov/publications/index.htm    And look into health and wellness programs that may be available through your health insurance provider, employer, local community center, or opal club.    Weight management plan: Patient was referred to their PCP to discuss a diet and exercise plan.              Follow-ups after your visit        Follow-up notes from your care team     Return in about 6 months (around 10/24/2018) for PAP Compliance Check.      Your next 10 appointments already scheduled     Oct 30, 2018 10:00 AM CDT   Return Sleep Patient with Kevin Vaughn MD   [facility] ([facility])    [address] [place] [id number] [phone]              Who to contact     If you have questions or need follow up information about today's clinic visit or your schedule please contact [facility] directly at [phone].  Normal or non-critical lab and imaging results will be communicated to you by MyChart, letter or phone within 4 business days  "after the clinic has received the results. If you do not hear from us within 7 days, please contact the clinic through [employer] or phone. If you have a critical or abnormal lab result, we will notify you by phone as soon as possible.  Submit refill requests through [employer] or call your pharmacy and they will forward the refill request to us. Please allow 3 business days for your refill to be completed.          Additional Information About Your Visit        [employer] [employer] [employer] [facility] Information     [employer] gives you secure access to your electronic health record. If you see a primary care provider, you can also send messages to your care team and make appointments. If you have questions, please call your primary care clinic.  If you do not have a primary care provider, please call [phone] and they will assist you.        Care EveryWhere ID     This is your Care EveryWhere ID. This could be used by other organizations to access your [place] medical records  [id number]        Your Vitals Were     Pulse Height Pulse Oximetry BMI (Body Mass Index)          76 1.803 m (5' 11\") 91% 29.29 kg/m2         Blood Pressure from Last 3 Encounters:   04/24/18 107/64   01/30/18 132/78   01/02/18 122/79    Weight from Last 3 Encounters:   04/24/18 95.3 kg (210 lb)   01/02/18 95.3 kg (210 lb)   10/25/17 95.3 kg (210 lb)              Today, you had the following     No orders found for display       Primary Care Provider Office Phone # Fax #    Manny Seymour [phone]       [address] [facility] [address]        Equal Access to Services     [facility] : Hadii aad ku hadasho Soomaali, waaxda luqadaha, qaybta kaalmada yvonne, kwame corado . So [facility] [phone].    ATENCIÓN: Si habla español, tiene a mckeon disposición servicios gratuitos de asistencia lingüística. Llame al [phone].    We comply with applicable federal civil rights laws and [place] laws. We do not discriminate on the " basis of race, color, national origin, age, disability, sex, sexual orientation, or gender identity.            Thank you!     Thank you for choosing [facility]  for your care. Our goal is always to provide you with excellent care. Hearing back from our patients is one way we can continue to improve our services. Please take a few minutes to complete the written survey that you may receive in the mail after your visit with us. Thank you!             Your Updated Medication List - Protect others around you: Learn how to safely use, store and throw away your medicines at www.disposemymeds.org.          This list is accurate as of 4/24/18 11:15 AM.  Always use your most recent med list.                   Brand Name Dispense Instructions for use Diagnosis    ASPIRIN 81 PO           baclofen 20 MG tablet    LIORESAL    270 tablet    TAKE 1 TABLET (20 MG) BY MOUTH 4 TIMES DAILY AS NEEDED    Chronic pain syndrome, Quadriplegia (H)       CHOICE DM FIBER-BURST OR      Take  by mouth daily. Am and pm    Diabetes mellitus, type 2 (H), Quadriplegia (H), Osteomyelitis (H), Spasms       COENZYME Q-10 PO      Take 100 mg by mouth 2 times daily.        DULoxetine 60 MG EC capsule    CYMBALTA    90 capsule    TAKE 1 CAPSULE (60 MG) BY MOUTH DAILY    Decubitus ulcer of left ischium, stage 4 (H)       FLORASTOR 250 MG capsule   Generic drug:  saccharomyces boulardii      Take 250 mg by mouth daily        furosemide 20 MG tablet    LASIX    30 tablet    Take 1 tablet (20 mg) by mouth daily as needed    Diastolic dysfunction, SOB (shortness of breath)       glipiZIDE XL 5 MG 24 hr tablet   Generic drug:  glipiZIDE     90 tablet    TAKE 1 TABLET (5 MG) BY MOUTH DAILY    Type 2 diabetes mellitus with stage 3 chronic kidney disease, without long-term current use of insulin (H)       MIRALAX PO      Take by mouth 2 times daily        Multiple vitamin Tabs      1 TABLET DAILY        order for DME     30 days    Equipment  being ordered:[facility] Fax [phone] Reference Number# [id number] Primary Dressing Drawtex 3x39 roll Qty 5 rolls Length of Need: 1 month Frequency of dressing change: daily    Pressure ulcer of trochanteric region of left hip, stage 4 (H)       PROAIR  (90 Base) MCG/ACT Inhaler   Generic drug:  albuterol     1 g    INHALE 2 PUFFS INTO THE LUNGS EVERY 6 HOURS AS NEEDED FOR SHORTNESS OF BREATH / DYSPNEA OR WHEEZING    SOB (shortness of breath)       probiotic Caps      daily        PILAR FOR MEN Misc     120 Bottle    1 pad as needed.    Quadriplegia (H)       TYLENOL CAPS 500 MG OR     60 Cap    2 CAPSULE EVERY 6 HOURS AS NEEDED (taking 4 x daily as needed)
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
## 2018-04-24 NOTE — NURSING NOTE
"Chief Complaint   Patient presents with     Study Results       Initial /64  Pulse 76  Ht 1.803 m (5' 11\")  Wt 95.3 kg (210 lb)  SpO2 91%  BMI 29.29 kg/m2 Estimated body mass index is 29.29 kg/(m^2) as calculated from the following:    Height as of this encounter: 1.803 m (5' 11\").    Weight as of this encounter: 95.3 kg (210 lb).  Medication Reconciliation: complete    "

## 2018-05-02 DIAGNOSIS — E11.22 TYPE 2 DIABETES MELLITUS WITH STAGE 3 CHRONIC KIDNEY DISEASE, WITHOUT LONG-TERM CURRENT USE OF INSULIN (H): ICD-10-CM

## 2018-05-02 DIAGNOSIS — N18.30 TYPE 2 DIABETES MELLITUS WITH STAGE 3 CHRONIC KIDNEY DISEASE, WITHOUT LONG-TERM CURRENT USE OF INSULIN (H): ICD-10-CM

## 2018-05-02 NOTE — TELEPHONE ENCOUNTER
"Requested Prescriptions   Pending Prescriptions Disp Refills     GLIPIZIDE XL 5 MG 24 hr tablet [Pharmacy Med Name: GLIPIZIDE XL 5 MG TAB ER 24] 90 tablet      Sig: TAKE 1 TABLET (5 MG) BY MOUTH DAILY    Sulfonylurea Agents Failed    5/2/2018 10:56 AM       Failed - Patient has documented LDL within the past 12 mos.    Recent Labs   Lab Test  09/09/16   0941   LDL  92            Failed - Patient has had a Microalbumin in the past 12 mos.    Recent Labs   Lab Test  05/05/14   1232   MICROL  669   UMALCR  1153.45*            Failed - Patient has a recent creatinine (normal) within the past 12 mos.    Recent Labs   Lab Test  01/30/18   1530   CR  0.55*            Passed - Blood pressure less than 140/90 in past 6 months    BP Readings from Last 3 Encounters:   04/24/18 107/64   01/30/18 132/78   01/02/18 122/79                Passed - Patient has documented A1c within the specified period of time.    Recent Labs   Lab Test  01/30/18   1530   A1C  6.8*            Passed - Patient is age 18 or older       Passed - Recent (6 mo) or future (30 days) visit within the authorizing provider's specialty    Patient had office visit in the last 6 months or has a visit in the next 30 days with authorizing provider or within the authorizing provider's specialty.  See \"Patient Info\" tab in inbasket, or \"Choose Columns\" in Meds & Orders section of the refill encounter.              Last Written Prescription Date:  1/26/18  Last Fill Quantity: 90,  # refills: 0   Last Office Visit with JULIO, DAVID or [facility] prescribing provider:  1/30/18   Future Office Visit:       "

## 2018-05-04 NOTE — TELEPHONE ENCOUNTER
Routing refill request to provider for review/approval because:  Labs out of range  Labs not current    Becca Colon RN. . .  5/4/2018, 2:39 PM

## 2018-05-06 RX ORDER — GLIPIZIDE 5 MG/1
TABLET, EXTENDED RELEASE ORAL
Qty: 90 TABLET | Refills: 3 | Status: SHIPPED | OUTPATIENT
Start: 2018-05-06

## 2018-06-13 ENCOUNTER — DOCUMENTATION ONLY (OUTPATIENT)
Dept: SLEEP MEDICINE | Facility: CLINIC | Age: 63
End: 2018-06-13

## 2018-06-13 DIAGNOSIS — G47.36 SLEEP-RELATED HYPOVENTILATION DUE TO NEUROMUSCULAR DISORDER (H): ICD-10-CM

## 2018-06-13 DIAGNOSIS — G70.9 SLEEP-RELATED HYPOVENTILATION DUE TO NEUROMUSCULAR DISORDER (H): ICD-10-CM

## 2018-06-13 NOTE — PROGRESS NOTES
6 Month [facility] visit    Data only recheck     Assessment: Pt meeting objective benchmarks.     Action plan: pt to follow up per provider request (1-2 yrs)  Device type: AVAPS/IVAPS  PAP settings:   EPAP Fixed 6     IPAP Min 11     IPAP Max 20     Rate 10     Rise 3     Vt 450          Objective measures: 14 day rolling measures         Compliance  100 %     % of night spent in large leak  1 % last  upload      AHI 6.56   last  upload      Average number of minutes 471           Objective measure goal  Compliance   Goal >70%  Leak   Goal < 10%  AHI  Goal < 5  Usage  Goal >240

## 2018-08-21 ENCOUNTER — DOCUMENTATION ONLY (OUTPATIENT)
Dept: SLEEP MEDICINE | Facility: CLINIC | Age: 63
End: 2018-08-21

## 2018-08-21 DIAGNOSIS — G70.9 SLEEP-RELATED HYPOVENTILATION DUE TO NEUROMUSCULAR DISORDER (H): ICD-10-CM

## 2018-08-21 DIAGNOSIS — G47.36 SLEEP-RELATED HYPOVENTILATION DUE TO NEUROMUSCULAR DISORDER (H): ICD-10-CM

## 2018-08-21 NOTE — PROGRESS NOTES
Patient came to [place] for mask fitting appointment on August 21, 2018. Patient requested to switch masks because [facility] did not have current mask or size on file to ship.  Patient was currently using Eson Med. Nasal mask.  Patient tried on the followings masks: Eson 2 Med.   Patient selected  [employer], type Eson 2 Nasal mask Medium.

## 2018-09-11 ENCOUNTER — TELEPHONE (OUTPATIENT)
Dept: INTERNAL MEDICINE | Facility: CLINIC | Age: 63
End: 2018-09-11

## 2018-09-11 DIAGNOSIS — G82.50 QUADRIPLEGIA (H): Primary | ICD-10-CM

## 2018-09-11 NOTE — TELEPHONE ENCOUNTER
Reason for Call: Request for an order or referral:    Order or referral being requested: wheel chair order sent to [facility] medical and supporting documentation for order to include diagnosis    Date needed: as soon as possible    Has the patient been seen by the PCP for this problem? YES    Additional comments: make sure order specifically includes; new tires & bearings, control box, tilt mechanism and foot pegs    Phone number Patient can be reached at:  Cell number on file:    Telephone Information:   Mobile [phone]       Best Time:      Can we leave a detailed message on this number?  YES    Call taken on 9/11/2018 at 3:06 PM by Teresa Mckinney

## 2018-09-19 DIAGNOSIS — G89.4 CHRONIC PAIN SYNDROME: ICD-10-CM

## 2018-09-19 DIAGNOSIS — G82.50 QUADRIPLEGIA (H): ICD-10-CM

## 2018-09-19 RX ORDER — BACLOFEN 20 MG/1
TABLET ORAL
Qty: 270 TABLET | Refills: 1 | Status: SHIPPED | OUTPATIENT
Start: 2018-09-19

## 2018-09-19 NOTE — TELEPHONE ENCOUNTER
Baclofen 20 MG       Last Written Prescription Date:  2/13/18  Last Fill Quantity: 270,   # refills: 0  Last Office Visit: 1/30/18  Future Office visit:       Routing refill request to provider for review/approval because:  Drug not on the [facility], P or [facility] refill protocol or controlled substance

## 2018-09-24 NOTE — NURSING NOTE
"Chief Complaint   Patient presents with     RECHECK     pencillin testing       Initial /68  Pulse 77  SpO2 98% Estimated body mass index is 29.15 kg/(m^2) as calculated from the following:    Height as of 9/9/16: 5' 11\" (1.803 m).    Weight as of 6/28/17: 209 lb (94.8 kg).  Medication Reconciliation: complete   Soheila Adair MA      " [id number]  Chief 

## 2018-10-02 ENCOUNTER — TRANSFERRED RECORDS (OUTPATIENT)
Dept: HEALTH INFORMATION MANAGEMENT | Facility: CLINIC | Age: 63
End: 2018-10-02

## 2018-10-30 ENCOUNTER — OFFICE VISIT (OUTPATIENT)
Dept: SLEEP MEDICINE | Facility: CLINIC | Age: 63
End: 2018-10-30
Payer: COMMERCIAL

## 2018-10-30 ENCOUNTER — OFFICE VISIT (OUTPATIENT)
Dept: INTERNAL MEDICINE | Facility: CLINIC | Age: 63
End: 2018-10-30
Payer: COMMERCIAL

## 2018-10-30 VITALS
DIASTOLIC BLOOD PRESSURE: 66 MMHG | RESPIRATION RATE: 16 BRPM | TEMPERATURE: 98.3 F | OXYGEN SATURATION: 98 % | SYSTOLIC BLOOD PRESSURE: 114 MMHG | HEART RATE: 74 BPM

## 2018-10-30 VITALS
WEIGHT: 210 LBS | DIASTOLIC BLOOD PRESSURE: 66 MMHG | OXYGEN SATURATION: 98 % | HEIGHT: 71 IN | BODY MASS INDEX: 29.4 KG/M2 | SYSTOLIC BLOOD PRESSURE: 103 MMHG | HEART RATE: 89 BPM

## 2018-10-30 DIAGNOSIS — G82.50 QUADRIPLEGIA (H): ICD-10-CM

## 2018-10-30 DIAGNOSIS — E11.22 TYPE 2 DIABETES MELLITUS WITH STAGE 3 CHRONIC KIDNEY DISEASE, WITHOUT LONG-TERM CURRENT USE OF INSULIN (H): ICD-10-CM

## 2018-10-30 DIAGNOSIS — G47.36 SLEEP-RELATED HYPOVENTILATION DUE TO NEUROMUSCULAR DISORDER (H): ICD-10-CM

## 2018-10-30 DIAGNOSIS — G70.9 SLEEP-RELATED HYPOVENTILATION DUE TO NEUROMUSCULAR DISORDER (H): ICD-10-CM

## 2018-10-30 DIAGNOSIS — Z01.818 PREOP GENERAL PHYSICAL EXAM: Primary | ICD-10-CM

## 2018-10-30 DIAGNOSIS — N18.30 TYPE 2 DIABETES MELLITUS WITH STAGE 3 CHRONIC KIDNEY DISEASE, WITHOUT LONG-TERM CURRENT USE OF INSULIN (H): ICD-10-CM

## 2018-10-30 DIAGNOSIS — Z23 NEED FOR PROPHYLACTIC VACCINATION AND INOCULATION AGAINST INFLUENZA: ICD-10-CM

## 2018-10-30 DIAGNOSIS — N20.0 NEPHROLITHIASIS: ICD-10-CM

## 2018-10-30 LAB
ANION GAP SERPL CALCULATED.3IONS-SCNC: 8 MMOL/L (ref 3–14)
BUN SERPL-MCNC: 29 MG/DL (ref 7–30)
CALCIUM SERPL-MCNC: 9.4 MG/DL (ref 8.5–10.1)
CHLORIDE SERPL-SCNC: 100 MMOL/L (ref 94–109)
CO2 SERPL-SCNC: 29 MMOL/L (ref 20–32)
CREAT SERPL-MCNC: 0.74 MG/DL (ref 0.66–1.25)
ERYTHROCYTE [DISTWIDTH] IN BLOOD BY AUTOMATED COUNT: 14.5 % (ref 10–15)
GFR SERPL CREATININE-BSD FRML MDRD: >90 ML/MIN/1.7M2
GLUCOSE SERPL-MCNC: 150 MG/DL (ref 70–99)
HBA1C MFR BLD: 7.2 % (ref 0–5.6)
HCT VFR BLD AUTO: 39.4 % (ref 40–53)
HGB BLD-MCNC: 12.3 G/DL (ref 13.3–17.7)
MCH RBC QN AUTO: 29 PG (ref 26.5–33)
MCHC RBC AUTO-ENTMCNC: 31.2 G/DL (ref 31.5–36.5)
MCV RBC AUTO: 93 FL (ref 78–100)
PLATELET # BLD AUTO: 324 10E9/L (ref 150–450)
POTASSIUM SERPL-SCNC: 4.1 MMOL/L (ref 3.4–5.3)
RBC # BLD AUTO: 4.24 10E12/L (ref 4.4–5.9)
SODIUM SERPL-SCNC: 137 MMOL/L (ref 133–144)
WBC # BLD AUTO: 9.8 10E9/L (ref 4–11)

## 2018-10-30 PROCEDURE — 85027 COMPLETE CBC AUTOMATED: CPT | Performed by: INTERNAL MEDICINE

## 2018-10-30 PROCEDURE — 83036 HEMOGLOBIN GLYCOSYLATED A1C: CPT | Performed by: INTERNAL MEDICINE

## 2018-10-30 PROCEDURE — G0008 ADMIN INFLUENZA VIRUS VAC: HCPCS | Performed by: INTERNAL MEDICINE

## 2018-10-30 PROCEDURE — 36415 COLL VENOUS BLD VENIPUNCTURE: CPT | Performed by: INTERNAL MEDICINE

## 2018-10-30 PROCEDURE — 93000 ELECTROCARDIOGRAM COMPLETE: CPT | Performed by: INTERNAL MEDICINE

## 2018-10-30 PROCEDURE — 90682 RIV4 VACC RECOMBINANT DNA IM: CPT | Performed by: INTERNAL MEDICINE

## 2018-10-30 PROCEDURE — 99215 OFFICE O/P EST HI 40 MIN: CPT | Mod: 25 | Performed by: INTERNAL MEDICINE

## 2018-10-30 PROCEDURE — 80048 BASIC METABOLIC PNL TOTAL CA: CPT | Performed by: INTERNAL MEDICINE

## 2018-10-30 PROCEDURE — 99213 OFFICE O/P EST LOW 20 MIN: CPT | Performed by: INTERNAL MEDICINE

## 2018-10-30 ASSESSMENT — PAIN SCALES - GENERAL: PAINLEVEL: EXTREME PAIN (9)

## 2018-10-30 ASSESSMENT — PATIENT HEALTH QUESTIONNAIRE - PHQ9: SUM OF ALL RESPONSES TO PHQ QUESTIONS 1-9: 6

## 2018-10-30 NOTE — NURSING NOTE
"Chief Complaint   Patient presents with     CPAP Follow Up       Initial There were no vitals taken for this visit. Estimated body mass index is 29.29 kg/(m^2) as calculated from the following:    Height as of 4/24/18: 1.803 m (5' 11\").    Weight as of 4/24/18: 95.3 kg (210 lb).    Medication Reconciliation: complete      "
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
## 2018-10-30 NOTE — PROGRESS NOTES
Injectable Influenza Immunization Documentation    1.  Is the person to be vaccinated sick today?   No    2. Does the person to be vaccinated have an allergy to a component   of the vaccine?   No  Egg Allergy Algorithm Link    3. Has the person to be vaccinated ever had a serious reaction   to influenza vaccine in the past?   No    4. Has the person to be vaccinated ever had Guillain-Barré syndrome?   No    Form completed by Kamille Chery CMA  Prior to injection verified patient identity using patient's name and date of birth.  Due to injection administration, patient instructed to remain in clinic for 15 minutes  afterwards, and to report any adverse reaction to me immediately.

## 2018-10-30 NOTE — MR AVS SNAPSHOT
After Visit Summary   10/30/2018    Alexandre Velásquez    MRN: [id number]           Patient Information     Date Of Birth          1955        Visit Information        Provider Department      10/30/2018 10:00 AM Kevin Vaughn MD [facility]        Today's Diagnoses     Sleep-related hypoventilation due to neuromuscular disorder (H)          Care Instructions      Your BMI is Body mass index is 29.29 kg/(m^2).  Weight management is a personal decision.  If you are interested in exploring weight loss strategies, the following discussion covers the approaches that may be successful. Body mass index (BMI) is one way to tell whether you are at a healthy weight, overweight, or obese. It measures your weight in relation to your height.  A BMI of 18.5 to 24.9 is in the healthy range. A person with a BMI of 25 to 29.9 is considered overweight, and someone with a BMI of 30 or greater is considered obese. More than two-thirds of American adults are considered overweight or obese.  Being overweight or obese increases the risk for further weight gain. Excess weight may lead to heart disease and diabetes.  Creating and following plans for healthy eating and physical activity may help you improve your health.  Weight control is part of healthy lifestyle and includes exercise, emotional health, and healthy eating habits. Careful eating habits lifelong are the mainstay of weight control. Though there are significant health benefits from weight loss, long-term weight loss with diet alone may be very difficult to achieve- studies show long-term success with dietary management in less than 10% of people. Attaining a healthy weight may be especially difficult to achieve in those with severe obesity. In some cases, medications, devices and surgical management might be considered.  What can you do?  If you are overweight or obese and are interested in methods for weight loss, you should  discuss this with your provider.     Consider reducing daily calorie intake by 500 calories.     Keep a food journal.     Avoiding skipping meals, consider cutting portions instead.    Diet combined with exercise helps maintain muscle while optimizing fat loss. Strength training is particularly important for building and maintaining muscle mass. Exercise helps reduce stress, increase energy, and improves fitness. Increasing exercise without diet control, however, may not burn enough calories to loose weight.       Start walking three days a week 10-20 minutes at a time    Work towards walking thirty minutes five days a week     Eventually, increase the speed of your walking for 1-2 minutes at time    In addition, we recommend that you review healthy lifestyles and methods for weight loss available through the National Institutes of Health patient information sites:  http://win.niddk.nih.gov/publications/index.htm    And look into health and wellness programs that may be available through your health insurance provider, employer, local community center, or opal club.    Weight management plan: Patient was referred to their PCP to discuss a diet and exercise plan.              Follow-ups after your visit        Follow-up notes from your care team     Return in 1 year (on 10/30/2019) for PAP follow up.      Your next 10 appointments already scheduled     Oct 30, 2018  1:00 PM CDT   Pre-Op physical with Manny Seymour MD   [facility]    [address] [place] [id number] [phone]              Who to contact     If you have questions or need follow up information about today's clinic visit or your schedule please contact [facility] directly at [phone].  Normal or non-critical lab and imaging results will be communicated to you by MyChart, letter or phone within 4 business days after the clinic has received the results. If you do not hear  "from us within 7 days, please contact the clinic through [employer] or phone. If you have a critical or abnormal lab result, we will notify you by phone as soon as possible.  Submit refill requests through [employer] or call your pharmacy and they will forward the refill request to us. Please allow 3 business days for your refill to be completed.          Additional Information About Your Visit        [employer] Information     [employer] gives you secure access to your electronic health record. If you see a primary care provider, you can also send messages to your care team and make appointments. If you have questions, please call your primary care clinic.  If you do not have a primary care provider, please call [phone] and they will assist you.        Care EveryWhere ID     This is your Care EveryWhere ID. This could be used by other organizations to access your [facility] medical records  [id number]        Your Vitals Were     Pulse Height Pulse Oximetry BMI (Body Mass Index)          89 1.803 m (5' 11\") 98% 29.29 kg/m2         Blood Pressure from Last 3 Encounters:   10/30/18 103/66   04/24/18 107/64   01/30/18 132/78    Weight from Last 3 Encounters:   10/30/18 95.3 kg (210 lb)   04/24/18 95.3 kg (210 lb)   01/02/18 95.3 kg (210 lb)              We Performed the Following     Sleep Comprehensive DME        Primary Care Provider Office Phone # Fax #    Manny Seymour [phone]       [address] [facility] [address]        Equal Access to Services     SHIRA MILLS AH: Hadii aad ku hadasho Soomaali, waaxda luqadaha, qaybta kaalmada adeegyada, waxay umu haysubhash corado . So [facility] [phone].    ATENCIÓN: Si habla español, tiene a mckeon disposición servicios gratuitos de asistencia lingüística. Llame al [phone].    We comply with applicable federal civil rights laws and [place] laws. We do not discriminate on the basis of race, color, national origin, age, disability, sex, sexual " orientation, or gender identity.            Thank you!     Thank you for choosing [facility]  for your care. Our goal is always to provide you with excellent care. Hearing back from our patients is one way we can continue to improve our services. Please take a few minutes to complete the written survey that you may receive in the mail after your visit with us. Thank you!             Your Updated Medication List - Protect others around you: Learn how to safely use, store and throw away your medicines at www.disposemymeds.org.          This list is accurate as of 10/30/18 10:20 AM.  Always use your most recent med list.                   Brand Name Dispense Instructions for use Diagnosis    baclofen 20 MG tablet    LIORESAL    270 tablet    TAKE 1 TABLET (20 MG) BY MOUTH 4 TIMES DAILY AS NEEDED    Chronic pain syndrome, Quadriplegia (H)       CHOICE DM FIBER-BURST OR      Take  by mouth daily. Am and pm    Diabetes mellitus, type 2 (H), Quadriplegia (H), Osteomyelitis (H), Spasms       COENZYME Q-10 PO      Take 100 mg by mouth 2 times daily.        DULoxetine 60 MG EC capsule    CYMBALTA    90 capsule    TAKE 1 CAPSULE (60 MG) BY MOUTH DAILY    Decubitus ulcer of left ischium, stage 4 (H)       glipiZIDE XL 5 MG 24 hr tablet   Generic drug:  glipiZIDE     90 tablet    TAKE 1 TABLET (5 MG) BY MOUTH DAILY    Type 2 diabetes mellitus with stage 3 chronic kidney disease, without long-term current use of insulin (H)       MIRALAX PO      Take by mouth 2 times daily        Multiple vitamin Tabs      1 TABLET DAILY        order for DME     30 days    Equipment being ordered:[facility] Fax [phone] Reference Number# [id number] Primary Dressing Drawtex 3x39 roll Qty 5 rolls Length of Need: 1 month Frequency of dressing change: daily    Pressure ulcer of trochanteric region of left hip, stage 4 (H)       order for DME     1 each    Equipment being ordered: electric/poweredWheelchair, specifically includes; new tires  & bearings, control box, tilt mechanism and foot pegs    Quadriplegia (H)       PROAIR  (90 Base) MCG/ACT inhaler   Generic drug:  albuterol     1 g    INHALE 2 PUFFS INTO THE LUNGS EVERY 6 HOURS AS NEEDED FOR SHORTNESS OF BREATH / DYSPNEA OR WHEEZING    SOB (shortness of breath)       probiotic Caps      daily        PILAR FOR MEN Misc     120 Bottle    1 pad as needed.    Quadriplegia (H)       TYLENOL CAPS 500 MG OR     60 Cap    2 CAPSULE EVERY 6 HOURS AS NEEDED (taking 4 x daily as needed)
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
## 2018-10-30 NOTE — MR AVS SNAPSHOT
After Visit Summary   10/30/2018    Alexandre Velásquez    MRN: [id number]           Patient Information     Date Of Birth          1955        Visit Information        Provider Department      10/30/2018 1:00 PM Manny Seymour MD [facility]        Today's Diagnoses     Preop general physical exam    -  1      Care Instructions      Before Your Surgery      Call your surgeon if there is any change in your health. This includes signs of a cold or flu (such as a sore throat, runny nose, cough, rash or fever).    Do not smoke, drink alcohol or take over the counter medicine (unless your surgeon or primary care doctor tells you to) for the 24 hours before and after surgery.    If you take prescribed drugs: Follow your doctor s orders about which medicines to take and which to stop until after surgery.    Eating and drinking prior to surgery: follow the instructions from your surgeon    Take a shower or bath the night before surgery. Use the soap your surgeon gave you to gently clean your skin. If you do not have soap from your surgeon, use your regular soap. Do not shave or scrub the surgery site.  Wear clean pajamas and have clean sheets on your bed.           Follow-ups after your visit        Who to contact     If you have questions or need follow up information about today's clinic visit or your schedule please contact [facility] directly at [phone].  Normal or non-critical lab and imaging results will be communicated to you by MyChart, letter or phone within 4 business days after the clinic has received the results. If you do not hear from us within 7 days, please contact the clinic through MyChart or phone. If you have a critical or abnormal lab result, we will notify you by phone as soon as possible.  Submit refill requests through [employer] or call your pharmacy and they will forward the refill request to us. Please allow 3 business days for your refill to be  completed.          Additional Information About Your Visit        MyChart Information     [employer] gives you secure access to your electronic health record. If you see a primary care provider, you can also send messages to your care team and make appointments. If you have questions, please call your primary care clinic.  If you do not have a primary care provider, please call [phone] and they will assist you.        Care EveryWhere ID     This is your Care EveryWhere ID. This could be used by other organizations to access your [facility] medical records  [id number]        Your Vitals Were     Pulse Temperature Respirations Pulse Oximetry          74 98.3  F (36.8  C) (Temporal) 16 98%         Blood Pressure from Last 3 Encounters:   10/30/18 114/66   10/30/18 103/66   04/24/18 107/64    Weight from Last 3 Encounters:   10/30/18 210 lb (95.3 kg)   04/24/18 210 lb (95.3 kg)   01/02/18 210 lb (95.3 kg)              Today, you had the following     No orders found for display       Primary Care Provider Office Phone # Fax #    Manny Seymour [phone]       [address] [place] [address]        Equal Access to Services     SHIRA MILLS AH: Hadii aad ku hadasho Soomaali, waaxda luqadaha, qaybta kaalmada adeegyada, waxay umu weavern jerri corado ah. So [facility] [phone].    ATENCIÓN: Si habla español, tiene a mckeon disposición servicios gratuitos de asistencia lingüística. Danita al [phone].    We comply with applicable federal civil rights laws and [place] laws. We do not discriminate on the basis of race, color, national origin, age, disability, sex, sexual orientation, or gender identity.            Thank you!     Thank you for choosing [facility]  for your care. Our goal is always to provide you with excellent care. Hearing back from our patients is one way we can continue to improve our services. Please take a few minutes to complete the written survey that  you may receive in the mail after your visit with us. Thank you!             Your Updated Medication List - Protect others around you: Learn how to safely use, store and throw away your medicines at www.disposemymeds.org.          This list is accurate as of 10/30/18  1:10 PM.  Always use your most recent med list.                   Brand Name Dispense Instructions for use Diagnosis    baclofen 20 MG tablet    LIORESAL    270 tablet    TAKE 1 TABLET (20 MG) BY MOUTH 4 TIMES DAILY AS NEEDED    Chronic pain syndrome, Quadriplegia (H)       CHOICE DM FIBER-BURST OR      Take  by mouth daily. Am and pm    Diabetes mellitus, type 2 (H), Quadriplegia (H), Osteomyelitis (H), Spasms       COENZYME Q-10 PO      Take 100 mg by mouth 2 times daily.        DULoxetine 60 MG EC capsule    CYMBALTA    90 capsule    TAKE 1 CAPSULE (60 MG) BY MOUTH DAILY    Decubitus ulcer of left ischium, stage 4 (H)       glipiZIDE XL 5 MG 24 hr tablet   Generic drug:  glipiZIDE     90 tablet    TAKE 1 TABLET (5 MG) BY MOUTH DAILY    Type 2 diabetes mellitus with stage 3 chronic kidney disease, without long-term current use of insulin (H)       MIRALAX PO      Take by mouth 2 times daily        Multiple vitamin Tabs      1 TABLET DAILY        order for DME     30 days    Equipment being ordered:[facility] Fax [phone] Reference Number# [id number] Primary Dressing Drawtex 3x39 roll Qty 5 rolls Length of Need: 1 month Frequency of dressing change: daily    Pressure ulcer of trochanteric region of left hip, stage 4 (H)       order for DME     1 each    Equipment being ordered: electric/poweredWheelchair, specifically includes; new tires & bearings, control box, tilt mechanism and foot pegs    Quadriplegia (H)       PROAIR  (90 Base) MCG/ACT inhaler   Generic drug:  albuterol     1 g    INHALE 2 PUFFS INTO THE LUNGS EVERY 6 HOURS AS NEEDED FOR SHORTNESS OF BREATH / DYSPNEA OR WHEEZING    SOB (shortness of breath)       probiotic Caps      daily         PILAR FOR MEN Misc     120 Bottle    1 pad as needed.    Quadriplegia (H)       TYLENOL CAPS 500 MG OR     60 Cap    2 CAPSULE EVERY 6 HOURS AS NEEDED (taking 4 x daily as needed)

## 2018-11-14 ENCOUNTER — TELEPHONE (OUTPATIENT)
Dept: INTERNAL MEDICINE | Facility: CLINIC | Age: 63
End: 2018-11-14

## 2018-11-14 NOTE — TELEPHONE ENCOUNTER
Patient called to schedule an appointment for a hospital follow-up or appeared on a report showing that they were recently discharged from the hospital.    Patient was admitted to [facility]  Discharged date: 11/14/2018  Reason for hospital admission:  L Renal stones  Does patient have future appointment scheduled with provider? Yes Dr Seymour  Date of future appointment:  11/20/18      This information will be used to help the care team plan for the patients upcoming visit.  The triage RN may determine that a follow up call is necessary and reach out to the patient via the phone number listed in the chart.     Please route this message on routine priority to the Triage RN pool.

## 2018-11-15 NOTE — TELEPHONE ENCOUNTER
"Hospital/TCU/ED for chronic condition Discharge Protocol    \"Hi, my name is Olesya Ash, a registered nurse, and I am calling from [facility].  I am calling to follow up and see how things are going for you after your recent emergency visit/hospital/TCU stay.\"    Tell me how you are doing now that you are home?\" things are going good.       Discharge Instructions    \"Let's review your discharge instructions.  What is/are the follow-up recommendations?  Pt. Response: Regular diet and regular exercise.     \"Has an appointment with your primary care provider been scheduled?\"   Yes. (confirm) Tuesday 11/20/18 with Dr. Seymour at 2:15    \"When you see the provider, I would recommend that you bring your medications with you.\"    Medications    \"Tell me what changed about your medicines when you discharged?\"    Changes to chronic meds?    NO changes in his medications.     \"What questions do you have about your medications?\"    None     New diagnoses of heart failure, COPD, diabetes, or MI?    Just kidney stones in his Left kidney              Medication reconciliation completed? Yes  Was MTM referral placed (*Make sure to put transitions as reason for referral)?   No    Call Summary    \"What questions or concerns do you have about your recent visit and your follow-up care?\"     none    \"If you have questions or things don't continue to improve, we encourage you contact us through the main clinic number (give number).  Even if the clinic is not open, triage nurses are available 24/7 to help you.     We would like you to know that our clinic has extended hours (provide information).  We also have urgent care (provide details on closest location and hours/contact info)\"      \"Thank you for your time and take care!\"             "

## 2018-11-20 ENCOUNTER — OFFICE VISIT (OUTPATIENT)
Dept: INTERNAL MEDICINE | Facility: CLINIC | Age: 63
End: 2018-11-20
Payer: COMMERCIAL

## 2018-11-20 VITALS
OXYGEN SATURATION: 97 % | SYSTOLIC BLOOD PRESSURE: 96 MMHG | RESPIRATION RATE: 16 BRPM | TEMPERATURE: 97.1 F | DIASTOLIC BLOOD PRESSURE: 64 MMHG | HEART RATE: 86 BPM

## 2018-11-20 DIAGNOSIS — N18.30 TYPE 2 DIABETES MELLITUS WITH STAGE 3 CHRONIC KIDNEY DISEASE, WITHOUT LONG-TERM CURRENT USE OF INSULIN (H): ICD-10-CM

## 2018-11-20 DIAGNOSIS — N20.0 NEPHROLITHIASIS: Primary | ICD-10-CM

## 2018-11-20 DIAGNOSIS — Z01.818 PREOP GENERAL PHYSICAL EXAM: ICD-10-CM

## 2018-11-20 DIAGNOSIS — D50.8 OTHER IRON DEFICIENCY ANEMIA: ICD-10-CM

## 2018-11-20 DIAGNOSIS — G82.50 QUADRIPLEGIA (H): ICD-10-CM

## 2018-11-20 DIAGNOSIS — E11.22 TYPE 2 DIABETES MELLITUS WITH STAGE 3 CHRONIC KIDNEY DISEASE, WITHOUT LONG-TERM CURRENT USE OF INSULIN (H): ICD-10-CM

## 2018-11-20 LAB
ANION GAP SERPL CALCULATED.3IONS-SCNC: 6 MMOL/L (ref 3–14)
BUN SERPL-MCNC: 25 MG/DL (ref 7–30)
CALCIUM SERPL-MCNC: 8.8 MG/DL (ref 8.5–10.1)
CHLORIDE SERPL-SCNC: 103 MMOL/L (ref 94–109)
CO2 SERPL-SCNC: 32 MMOL/L (ref 20–32)
CREAT SERPL-MCNC: 0.67 MG/DL (ref 0.66–1.25)
ERYTHROCYTE [DISTWIDTH] IN BLOOD BY AUTOMATED COUNT: 15.1 % (ref 10–15)
GFR SERPL CREATININE-BSD FRML MDRD: >90 ML/MIN/1.7M2
GLUCOSE SERPL-MCNC: 196 MG/DL (ref 70–99)
HCT VFR BLD AUTO: 35.8 % (ref 40–53)
HGB BLD-MCNC: 11.3 G/DL (ref 13.3–17.7)
MCH RBC QN AUTO: 29.1 PG (ref 26.5–33)
MCHC RBC AUTO-ENTMCNC: 31.6 G/DL (ref 31.5–36.5)
MCV RBC AUTO: 92 FL (ref 78–100)
PLATELET # BLD AUTO: 361 10E9/L (ref 150–450)
POTASSIUM SERPL-SCNC: 4 MMOL/L (ref 3.4–5.3)
RBC # BLD AUTO: 3.88 10E12/L (ref 4.4–5.9)
SODIUM SERPL-SCNC: 141 MMOL/L (ref 133–144)
WBC # BLD AUTO: 8.3 10E9/L (ref 4–11)

## 2018-11-20 PROCEDURE — 99495 TRANSJ CARE MGMT MOD F2F 14D: CPT | Performed by: INTERNAL MEDICINE

## 2018-11-20 ASSESSMENT — PAIN SCALES - GENERAL: PAINLEVEL: SEVERE PAIN (6)
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
## 2018-11-20 NOTE — MR AVS SNAPSHOT
After Visit Summary   11/20/2018    Alexandre Velásquez    MRN: [id number]           Patient Information     Date Of Birth          1955        Visit Information        Provider Department      11/20/2018 2:15 PM Manny Seymour MD [facility]        Today's Diagnoses     Nephrolithiasis    -  1    Preop general physical exam        Quadriplegia (H)        Type 2 diabetes mellitus with stage 3 chronic kidney disease, without long-term current use of insulin (H)        Other iron deficiency anemia          Care Instructions      Before Your Surgery      Call your surgeon if there is any change in your health. This includes signs of a cold or flu (such as a sore throat, runny nose, cough, rash or fever).    Do not smoke, drink alcohol or take over the counter medicine (unless your surgeon or primary care doctor tells you to) for the 24 hours before and after surgery.    If you take prescribed drugs: Follow your doctor s orders about which medicines to take and which to stop until after surgery.    Eating and drinking prior to surgery: follow the instructions from your surgeon    Take a shower or bath the night before surgery. Use the soap your surgeon gave you to gently clean your skin. If you do not have soap from your surgeon, use your regular soap. Do not shave or scrub the surgery site.  Wear clean pajamas and have clean sheets on your bed.           Follow-ups after your visit        Follow-up notes from your care team     Return in about 3 months (around 2/20/2019) for Routine Visit.      Who to contact     If you have questions or need follow up information about today's clinic visit or your schedule please contact [facility] directly at [phone].  Normal or non-critical lab and imaging results will be communicated to you by MyChart, letter or phone within 4 business days after the clinic has received the results. If you do not hear from us within 7 days,  please contact the clinic through [employer] or phone. If you have a critical or abnormal lab result, we will notify you by phone as soon as possible.  Submit refill requests through [employer] or call your pharmacy and they will forward the refill request to us. Please allow 3 business days for your refill to be completed.          Additional Information About Your Visit        cashcloudharCASTT Information     [employer] gives you secure access to your electronic health record. If you see a primary care provider, you can also send messages to your care team and make appointments. If you have questions, please call your primary care clinic.  If you do not have a primary care provider, please call [phone] and they will assist you.        Care EveryWhere ID     This is your Care EveryWhere ID. This could be used by other organizations to access your [facility] medical records  [id number]        Your Vitals Were     Pulse Temperature Respirations Pulse Oximetry          86 97.1  F (36.2  C) (Temporal) 16 97%         Blood Pressure from Last 3 Encounters:   11/20/18 96/64   10/30/18 114/66   10/30/18 103/66    Weight from Last 3 Encounters:   10/30/18 210 lb (95.3 kg)   04/24/18 210 lb (95.3 kg)   01/02/18 210 lb (95.3 kg)              We Performed the Following     Basic metabolic panel     CBC with platelets        Primary Care Provider Office Phone # Fax #    Manny Seymour [phone]       [address] [facility] [address]        Equal Access to Services     [facility] Center[facility] : Hadii aad ku hadasho Sopattiali, waaxda luqadaha, qaybta kaalmada jerriyada, kwame eaton. So [place] [phone].    ATENCIÓN: Si habla español, tiene a mckeon disposición servicios gratuitos de asistencia lingüística. Llame al [phone].    We comply with applicable federal civil rights laws and [place] laws. We do not discriminate on the basis of race, color, national origin, age, disability, sex, sexual  orientation, or gender identity.            Thank you!     Thank you for choosing [facility]  for your care. Our goal is always to provide you with excellent care. Hearing back from our patients is one way we can continue to improve our services. Please take a few minutes to complete the written survey that you may receive in the mail after your visit with us. Thank you!             Your Updated Medication List - Protect others around you: Learn how to safely use, store and throw away your medicines at www.disposemymeds.org.          This list is accurate as of 11/20/18  3:22 PM.  Always use your most recent med list.                   Brand Name Dispense Instructions for use Diagnosis    baclofen 20 MG tablet    LIORESAL    270 tablet    TAKE 1 TABLET (20 MG) BY MOUTH 4 TIMES DAILY AS NEEDED    Chronic pain syndrome, Quadriplegia (H)       CHOICE DM FIBER-BURST OR      Take  by mouth daily. Am and pm    Diabetes mellitus, type 2 (H), Quadriplegia (H), Osteomyelitis (H), Spasms       COENZYME Q-10 PO      Take 100 mg by mouth 2 times daily.        DULoxetine 60 MG EC capsule    CYMBALTA    90 capsule    TAKE 1 CAPSULE (60 MG) BY MOUTH DAILY    Decubitus ulcer of left ischium, stage 4 (H)       glipiZIDE XL 5 MG 24 hr tablet   Generic drug:  glipiZIDE     90 tablet    TAKE 1 TABLET (5 MG) BY MOUTH DAILY    Type 2 diabetes mellitus with stage 3 chronic kidney disease, without long-term current use of insulin (H)       MIRALAX PO      Take by mouth 2 times daily        Multiple vitamin Tabs      1 TABLET DAILY        order for DME     30 days    Equipment being ordered:[place] Fax [phone] Reference Number# [id number] Primary Dressing Drawtex 3x39 roll Qty 5 rolls Length of Need: 1 month Frequency of dressing change: daily    Pressure ulcer of trochanteric region of left hip, stage 4 (H)       order for DME     1 each    Equipment being ordered: electric/poweredWheelchair, specifically includes; new tires  & bearings, control box, tilt mechanism and foot pegs    Quadriplegia (H)       PROAIR  (90 Base) MCG/ACT inhaler   Generic drug:  albuterol     1 g    INHALE 2 PUFFS INTO THE LUNGS EVERY 6 HOURS AS NEEDED FOR SHORTNESS OF BREATH / DYSPNEA OR WHEEZING    SOB (shortness of breath)       probiotic Caps      daily        PILAR FOR MEN Misc     120 Bottle    1 pad as needed.    Quadriplegia (H)       TYLENOL CAPS 500 MG OR     60 Cap    2 CAPSULE EVERY 6 HOURS AS NEEDED (taking 4 x daily as needed)

## 2018-12-10 ENCOUNTER — TRANSFERRED RECORDS (OUTPATIENT)
Dept: HEALTH INFORMATION MANAGEMENT | Facility: CLINIC | Age: 63
End: 2018-12-10

## 2018-12-12 ENCOUNTER — TELEPHONE (OUTPATIENT)
Dept: INTERNAL MEDICINE | Facility: CLINIC | Age: 63
End: 2018-12-12

## 2018-12-12 NOTE — TELEPHONE ENCOUNTER
Patient called to schedule an appointment for a hospital follow-up or appeared on a report showing that they were recently discharged from the hospital.    Patient was admitted to [facility]: 12/10/18  [phone]  Discharged date: 12/12/18  Reason for hospital admission:  Kidney stones  Does patient have future appointment scheduled with provider? Yes Dr Mckenna     Date of future appointment:  12/19/18 at 10:40      This information will be used to help the care team plan for the patients upcoming visit.  The triage RN may determine that a follow up call is necessary and reach out to the patient via the phone number listed in the chart.     Please route this message on routine priority to the Triage RN pool.

## 2018-12-14 ENCOUNTER — TRANSFERRED RECORDS (OUTPATIENT)
Dept: HEALTH INFORMATION MANAGEMENT | Facility: CLINIC | Age: 63
End: 2018-12-14

## 2018-12-14 LAB — EJECTION FRACTION: 63

## 2019-01-07 ENCOUNTER — TELEPHONE (OUTPATIENT)
Dept: INTERNAL MEDICINE | Facility: CLINIC | Age: 64
End: 2019-01-07

## 2019-01-07 NOTE — TELEPHONE ENCOUNTER
Reason for Call:  Call back [facility] Examiner     Detailed comments: Investigator calling and states she needs last clinic visit, history and medications for patient. Please advise.     Phone Number Patient can be reached at: [facility] or any investigator at [phone]    Best Time: any    Can we leave a detailed message on this number? Not Applicable    Call taken on 1/7/2019 at 4:06 PM by Natalya Chawla

## 2019-01-07 NOTE — TELEPHONE ENCOUNTER
Phone just goes to busy right away when I call.  If they call back they need to talk to medical records to get what they want.

## 2019-01-09 NOTE — TELEPHONE ENCOUNTER
Vaishali from [facility] calling back and states she needs to talk to Dr Seymour or his nurse regarding this, not medical records. Verified phone number, please call [phone].

## 2019-01-10 ENCOUNTER — TELEPHONE (OUTPATIENT)
Dept: INTERNAL MEDICINE | Facility: CLINIC | Age: 64
End: 2019-01-10

## 2019-08-20 NOTE — NURSING NOTE
Completed a all night titration PSG per provider order.    Preliminary AHI 25.  A final therapeutic PAP pressure was achieved.    Supine REM was seen on therapeutic pressure.    Patient reports feeling not refreshed in AM.   Improved

## 2021-11-10 NOTE — TELEPHONE ENCOUNTER
Ok to refill but why the gap or no refills since February.   Mohs Histo Method Verbiage: Each section was then chromacoded and processed in the Mohs lab using the Mohs protocol and submitted for frozen section.

## 2024-01-08 NOTE — TELEPHONE ENCOUNTER
12:00 tomorrow.    
Pt informed of day and time.  
Reason for call:  Same Day Appointment  Reason for Call:  Same Day Appointment, Requested Provider:  Manny Seymour MD    PCP: Manny Seymour    Reason for visit: F/u on cellulitis    Duration of symptoms: ongoing     Have you been treated for this in the past? Yes    Additional comments: Pt would like to see PL today or anytime this week or anyone able to see him is fine. Wife stated he is in pain and medication is running out     Can we leave a detailed message on this number? YES    Phone number patient can be reached at: Home number on file There is no home phone number on file. or Cell number on file:    Telephone Information:   Mobile [phone]       Best Time: anytime    Call taken on 10/2/2017 at 2:20 PM by Ita Lorenz    
Pediatric